# Patient Record
Sex: MALE | Race: BLACK OR AFRICAN AMERICAN | Employment: OTHER | URBAN - METROPOLITAN AREA
[De-identification: names, ages, dates, MRNs, and addresses within clinical notes are randomized per-mention and may not be internally consistent; named-entity substitution may affect disease eponyms.]

---

## 2018-09-19 ENCOUNTER — APPOINTMENT (OUTPATIENT)
Dept: GENERAL RADIOLOGY | Age: 63
End: 2018-09-19
Attending: PHYSICIAN ASSISTANT
Payer: SELF-PAY

## 2018-09-19 ENCOUNTER — HOSPITAL ENCOUNTER (EMERGENCY)
Age: 63
Discharge: HOME | End: 2018-09-19
Attending: EMERGENCY MEDICINE | Admitting: EMERGENCY MEDICINE
Payer: SELF-PAY

## 2018-09-19 VITALS
TEMPERATURE: 97.9 F | HEART RATE: 80 BPM | WEIGHT: 193 LBS | BODY MASS INDEX: 24.77 KG/M2 | DIASTOLIC BLOOD PRESSURE: 80 MMHG | RESPIRATION RATE: 16 BRPM | OXYGEN SATURATION: 99 % | SYSTOLIC BLOOD PRESSURE: 140 MMHG | HEIGHT: 74 IN

## 2018-09-19 DIAGNOSIS — M25.511 PAIN IN JOINT OF RIGHT SHOULDER: ICD-10-CM

## 2018-09-19 DIAGNOSIS — S43.004A DISLOCATION OF RIGHT SHOULDER JOINT, INITIAL ENCOUNTER: Primary | ICD-10-CM

## 2018-09-19 PROCEDURE — 99283 EMERGENCY DEPT VISIT LOW MDM: CPT

## 2018-09-19 PROCEDURE — 74011250636 HC RX REV CODE- 250/636: Performed by: PHYSICIAN ASSISTANT

## 2018-09-19 PROCEDURE — 75810000301 HC ER LEVEL 1 CLOSED TREATMNT FRACTURE/DISLOCATION

## 2018-09-19 PROCEDURE — 96374 THER/PROPH/DIAG INJ IV PUSH: CPT

## 2018-09-19 PROCEDURE — 73030 X-RAY EXAM OF SHOULDER: CPT

## 2018-09-19 PROCEDURE — 74011250637 HC RX REV CODE- 250/637: Performed by: PHYSICIAN ASSISTANT

## 2018-09-19 RX ORDER — MIDAZOLAM HYDROCHLORIDE 1 MG/ML
6 INJECTION, SOLUTION INTRAMUSCULAR; INTRAVENOUS ONCE
Status: DISCONTINUED | OUTPATIENT
Start: 2018-09-19 | End: 2018-09-20 | Stop reason: HOSPADM

## 2018-09-19 RX ORDER — FENTANYL CITRATE 50 UG/ML
100 INJECTION, SOLUTION INTRAMUSCULAR; INTRAVENOUS ONCE
Status: COMPLETED | OUTPATIENT
Start: 2018-09-19 | End: 2018-09-19

## 2018-09-19 RX ORDER — OXYCODONE AND ACETAMINOPHEN 5; 325 MG/1; MG/1
1 TABLET ORAL
Status: COMPLETED | OUTPATIENT
Start: 2018-09-19 | End: 2018-09-19

## 2018-09-19 RX ORDER — HYDROCODONE BITARTRATE AND ACETAMINOPHEN 5; 325 MG/1; MG/1
1 TABLET ORAL
Qty: 12 TAB | Refills: 0 | Status: SHIPPED | OUTPATIENT
Start: 2018-09-19

## 2018-09-19 RX ADMIN — FENTANYL CITRATE 100 MCG: 50 INJECTION INTRAMUSCULAR; INTRAVENOUS at 17:32

## 2018-09-19 RX ADMIN — OXYCODONE HYDROCHLORIDE AND ACETAMINOPHEN 1 TABLET: 5; 325 TABLET ORAL at 17:37
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
## 2018-09-19 NOTE — DISCHARGE INSTRUCTIONS
Musculoskeletal Pain: Care Instructions  Your Care Instructions  Different problems with the bones, muscles, nerves, ligaments, and tendons in the body can cause pain. One or more areas of your body may ache or burn. Or they may feel tired, stiff, or sore. The medical term for this type of pain is musculoskeletal pain. It can have many different causes. Sometimes the pain is caused by an injury such as a strain or sprain. Or you might have pain from using one part of your body in the same way over and over again. This is called overuse. In some cases, the cause of the pain is another health problem such as arthritis or fibromyalgia. The doctor will examine you and ask you questions about your health to help find the cause of your pain. Blood tests or imaging tests like an X-ray may also be helpful. But sometimes doctors can't find a cause of the pain. Treatment depends on your symptoms and the cause of the pain, if known. The doctor has checked you carefully, but problems can develop later. If you notice any problems or new symptoms, get medical treatment right away. Follow-up care is a key part of your treatment and safety. Be sure to make and go to all appointments, and call your doctor if you are having problems. It's also a good idea to know your test results and keep a list of the medicines you take. How can you care for yourself at home? · Rest until you feel better. · Do not do anything that makes the pain worse. Return to exercise gradually if you feel better and your doctor says it's okay. · Be safe with medicines. Read and follow all instructions on the label. ¨ If the doctor gave you a prescription medicine for pain, take it as prescribed. ¨ If you are not taking a prescription pain medicine, ask your doctor if you can take an over-the-counter medicine. · Put ice or a cold pack on the area for 10 to 20 minutes at a time to ease pain. Put a thin cloth between the ice and your skin.   When should you call for help? Call your doctor now or seek immediate medical care if:  · You have new pain, or your pain gets worse. · You have new symptoms such as a fever, a rash, or chills. Watch closely for changes in your health, and be sure to contact your doctor if:  · You do not get better as expected. Where can you learn more? Go to "[employer]be  Enter Q624 in the search box to learn more about \"Musculoskeletal Pain: Care Instructions. \"   © 1513-5685 [employer]. Care instructions adapted under license by [employer] (which disclaims liability or warranty for this information). This care instruction is for use with your licensed healthcare professional. If you have questions about a medical condition or this instruction, always ask your healthcare professional. [address] any warranty or liability for your use of this information. Content Version: 63.9.315533; Current as of: November 20, 2015             Dislocated Shoulder: Care Instructions  Your Care Instructions    When the upper arm comes out of the shoulder socket, it is called a dislocated shoulder. After the doctor puts the shoulder back in place, he or she may put your arm in a sling or shoulder immobilizer. This will keep it from moving. Exercise and physical therapy can help your shoulder get strong and move normally again. It may take up to a year for your shoulder to heal and be free of pain. If your shoulder keeps coming out of place, talk to your doctor about surgery. It can prevent dislocations. You may have had a sedative to help you relax. You may be unsteady after having sedation. It can take a few hours for the medicine's effects to wear off. Common side effects of sedation include nausea, vomiting, and feeling sleepy or tired. The doctor has checked you carefully, but problems can develop later.  If you notice any problems or new symptoms, get medical treatment right away.  Follow-up care is a key part of your treatment and safety. Be sure to make and go to all appointments, and call your doctor if you are having problems. It's also a good idea to know your test results and keep a list of the medicines you take. How can you care for yourself at home? · If the doctor gave you a sedative:  ¨ For 24 hours, don't do anything that requires attention to detail. It takes time for the medicine's effects to completely wear off. ¨ For your safety, do not drive or operate any machinery that could be dangerous. Wait until the medicine wears off and you can think clearly and react easily. · If your doctor put your arm in a sling or shoulder immobilizer, wear it as directed. · Take pain medicines exactly as directed. ¨ If the doctor gave you a prescription medicine for pain, take it as prescribed. ¨ If you are not taking a prescription pain medicine, ask your doctor if you can take an over-the-counter medicine. · Put ice or a cold pack on your shoulder for 10 to 20 minutes at a time. Try to do this every 1 to 2 hours for the next 3 days (when you are awake). Put a thin cloth between the ice and your skin. · You may use warm packs after the first 3 days for 15 to 20 minutes at a time. This can ease pain. · If your doctor gave you exercises to do at home, do them exactly as your doctor told you. · Do not do anything that makes the pain worse. When should you call for help? Call 911 anytime you think you may need emergency care.  For example, call if:    · You have trouble breathing.     · You passed out (lost consciousness).    Call your doctor now or seek immediate medical care if:    · You have new or worse nausea or vomiting.     · You have new or worse pain.     · Your hand or fingers are cool or pale or change color.     · You have tingling, weakness, or numbness in your hand or fingers.    Watch closely for changes in your health, and be sure to contact your doctor if:    · You do not get better as expected. Where can you learn more? Go to http://vicente-rosaura.info/. Enter L520 in the search box to learn more about \"Dislocated Shoulder: Care Instructions. \"  Current as of: November 29, 2017  Content Version: 11.7  © 3257-0396 [employer]. Care instructions adapted under license by [employer] (which disclaims liability or warranty for this information). If you have questions about a medical condition or this instruction, always ask your healthcare professional. [address] any warranty or liability for your use of this information.

## 2018-09-19 NOTE — LETTER
NOTIFICATION RETURN TO WORK / SCHOOL 
 
9/19/2018 6:10 PM 
 
Mr. Chiquis Huizar [facility] [address] To Whom It May Concern: 
 
Chiquis Huizar is [facility] under the [facility]. He will return to work/school on: 9/22/18 If there are questions or concerns please have the patient contact our office.  
 
 
 
Sincerely, 
 
 
 
ADAM Jenkins

## 2018-09-19 NOTE — ED TRIAGE NOTES
Pt. States \"I slipped and fell and now I can't move this shoulder\" refers to right shoulder. Reports incident happened \"about a hour ago\".

## 2018-09-20 NOTE — ED NOTES
Reviewed discharge instructions including new medications and suggested follow-up. Questions encouraged and answered. sling care and application reviewed with patient. Questions encouraged and answered. Patient verbalized an understanding

## 2024-11-05 ENCOUNTER — APPOINTMENT (OUTPATIENT)
Facility: HOSPITAL | Age: 69
DRG: 682 | End: 2024-11-05
Payer: OTHER GOVERNMENT

## 2024-11-05 ENCOUNTER — APPOINTMENT (OUTPATIENT)
Facility: HOSPITAL | Age: 69
DRG: 682 | End: 2024-11-05
Attending: EMERGENCY MEDICINE
Payer: OTHER GOVERNMENT

## 2024-11-05 ENCOUNTER — HOSPITAL ENCOUNTER (INPATIENT)
Facility: HOSPITAL | Age: 69
LOS: 3 days | Discharge: HOME | DRG: 682 | End: 2024-11-08
Attending: EMERGENCY MEDICINE | Admitting: INTERNAL MEDICINE
Payer: OTHER GOVERNMENT

## 2024-11-05 DIAGNOSIS — I50.9 ACUTE ON CHRONIC HEART FAILURE, UNSPECIFIED HEART FAILURE TYPE (HCC): ICD-10-CM

## 2024-11-05 DIAGNOSIS — N18.4 ACUTE RENAL FAILURE SUPERIMPOSED ON STAGE 4 CHRONIC KIDNEY DISEASE, UNSPECIFIED ACUTE RENAL FAILURE TYPE (HCC): ICD-10-CM

## 2024-11-05 DIAGNOSIS — R52 PAIN: Primary | ICD-10-CM

## 2024-11-05 DIAGNOSIS — J81.0 ACUTE PULMONARY EDEMA: ICD-10-CM

## 2024-11-05 DIAGNOSIS — N30.00 ACUTE CYSTITIS WITHOUT HEMATURIA: ICD-10-CM

## 2024-11-05 DIAGNOSIS — N17.9 ACUTE RENAL FAILURE SUPERIMPOSED ON STAGE 4 CHRONIC KIDNEY DISEASE, UNSPECIFIED ACUTE RENAL FAILURE TYPE (HCC): ICD-10-CM

## 2024-11-05 LAB
ALBUMIN SERPL-MCNC: 3.5 G/DL (ref 3.4–5)
ALBUMIN/GLOB SERPL: 0.9 (ref 0.8–1.7)
ALP SERPL-CCNC: 76 U/L (ref 45–117)
ALT SERPL-CCNC: 20 U/L (ref 16–61)
ANION GAP SERPL CALC-SCNC: 10 MMOL/L (ref 3–18)
APPEARANCE UR: CLEAR
AST SERPL-CCNC: 19 U/L (ref 10–38)
BACTERIA URNS QL MICRO: ABNORMAL /HPF
BASOPHILS # BLD: 0.1 K/UL (ref 0–0.1)
BASOPHILS NFR BLD: 1 % (ref 0–2)
BILIRUB SERPL-MCNC: 0.5 MG/DL (ref 0.2–1)
BILIRUB UR QL: NEGATIVE
BUN SERPL-MCNC: 61 MG/DL (ref 7–18)
BUN/CREAT SERPL: 16 (ref 12–20)
CALCIUM SERPL-MCNC: 8.5 MG/DL (ref 8.5–10.1)
CHLORIDE SERPL-SCNC: 106 MMOL/L (ref 100–111)
CO2 SERPL-SCNC: 26 MMOL/L (ref 21–32)
COLOR UR: YELLOW
CREAT SERPL-MCNC: 3.83 MG/DL (ref 0.6–1.3)
D DIMER PPP FEU-MCNC: 0.8 UG/ML(FEU)
DIFFERENTIAL METHOD BLD: ABNORMAL
EOSINOPHIL # BLD: 0.1 K/UL (ref 0–0.4)
EOSINOPHIL NFR BLD: 2 % (ref 0–5)
EPITH CASTS URNS QL MICRO: NEGATIVE /LPF (ref 0–5)
ERYTHROCYTE [DISTWIDTH] IN BLOOD BY AUTOMATED COUNT: 16.9 % (ref 11.6–14.5)
GLOBULIN SER CALC-MCNC: 3.8 G/DL (ref 2–4)
GLUCOSE SERPL-MCNC: 91 MG/DL (ref 74–99)
GLUCOSE UR STRIP.AUTO-MCNC: 500 MG/DL
HCT VFR BLD AUTO: 36.1 % (ref 36–48)
HGB BLD-MCNC: 11.4 G/DL (ref 13–16)
HGB UR QL STRIP: ABNORMAL
IMM GRANULOCYTES # BLD AUTO: 0 K/UL (ref 0–0.04)
IMM GRANULOCYTES NFR BLD AUTO: 0 % (ref 0–0.5)
KETONES UR QL STRIP.AUTO: NEGATIVE MG/DL
LEUKOCYTE ESTERASE UR QL STRIP.AUTO: ABNORMAL
LYMPHOCYTES # BLD: 1.1 K/UL (ref 0.9–3.6)
LYMPHOCYTES NFR BLD: 20 % (ref 21–52)
MCH RBC QN AUTO: 25.9 PG (ref 24–34)
MCHC RBC AUTO-ENTMCNC: 31.6 G/DL (ref 31–37)
MCV RBC AUTO: 81.9 FL (ref 78–100)
MONOCYTES # BLD: 0.6 K/UL (ref 0.05–1.2)
MONOCYTES NFR BLD: 11 % (ref 3–10)
NEUTS SEG # BLD: 3.5 K/UL (ref 1.8–8)
NEUTS SEG NFR BLD: 66 % (ref 40–73)
NITRITE UR QL STRIP.AUTO: NEGATIVE
NRBC # BLD: 0 K/UL (ref 0–0.01)
NRBC BLD-RTO: 0 PER 100 WBC
NT PRO BNP: 1956 PG/ML (ref 0–900)
PH UR STRIP: 5.5 (ref 5–8)
PLATELET # BLD AUTO: 168 K/UL (ref 135–420)
PMV BLD AUTO: 8.3 FL (ref 9.2–11.8)
POTASSIUM SERPL-SCNC: 3.6 MMOL/L (ref 3.5–5.5)
PROT SERPL-MCNC: 7.3 G/DL (ref 6.4–8.2)
PROT UR STRIP-MCNC: 100 MG/DL
RBC # BLD AUTO: 4.41 M/UL (ref 4.35–5.65)
RBC #/AREA URNS HPF: ABNORMAL /HPF (ref 0–5)
SODIUM SERPL-SCNC: 142 MMOL/L (ref 136–145)
SP GR UR REFRACTOMETRY: 1.01 (ref 1–1.03)
TROPONIN I SERPL HS-MCNC: 38 NG/L (ref 0–78)
UROBILINOGEN UR QL STRIP.AUTO: 0.2 EU/DL (ref 0.2–1)
WBC # BLD AUTO: 5.4 K/UL (ref 4.6–13.2)
WBC URNS QL MICRO: ABNORMAL /HPF (ref 0–4)

## 2024-11-05 PROCEDURE — 6360000002 HC RX W HCPCS: Performed by: EMERGENCY MEDICINE

## 2024-11-05 PROCEDURE — 93005 ELECTROCARDIOGRAM TRACING: CPT | Performed by: EMERGENCY MEDICINE

## 2024-11-05 PROCEDURE — 99285 EMERGENCY DEPT VISIT HI MDM: CPT

## 2024-11-05 PROCEDURE — 93970 EXTREMITY STUDY: CPT

## 2024-11-05 PROCEDURE — 2580000003 HC RX 258: Performed by: EMERGENCY MEDICINE

## 2024-11-05 PROCEDURE — 71045 X-RAY EXAM CHEST 1 VIEW: CPT

## 2024-11-05 PROCEDURE — 96374 THER/PROPH/DIAG INJ IV PUSH: CPT

## 2024-11-05 PROCEDURE — 85379 FIBRIN DEGRADATION QUANT: CPT

## 2024-11-05 PROCEDURE — 1100000000 HC RM PRIVATE

## 2024-11-05 PROCEDURE — 74176 CT ABD & PELVIS W/O CONTRAST: CPT

## 2024-11-05 PROCEDURE — 84484 ASSAY OF TROPONIN QUANT: CPT

## 2024-11-05 PROCEDURE — 80053 COMPREHEN METABOLIC PANEL: CPT

## 2024-11-05 PROCEDURE — 81001 URINALYSIS AUTO W/SCOPE: CPT

## 2024-11-05 PROCEDURE — 85025 COMPLETE CBC W/AUTO DIFF WBC: CPT

## 2024-11-05 PROCEDURE — 83880 ASSAY OF NATRIURETIC PEPTIDE: CPT

## 2024-11-05 RX ORDER — GLIPIZIDE 10 MG/1
10 TABLET ORAL
COMMUNITY
Start: 2024-06-27

## 2024-11-05 RX ORDER — PSEUDOEPHEDRINE HCL 30 MG
100 TABLET ORAL
COMMUNITY
Start: 2024-06-27

## 2024-11-05 RX ORDER — ASPIRIN 81 MG/1
1 TABLET ORAL DAILY
COMMUNITY
Start: 2024-06-27

## 2024-11-05 RX ORDER — AMLODIPINE BESYLATE 10 MG/1
10 TABLET ORAL
COMMUNITY
Start: 2024-06-27

## 2024-11-05 RX ORDER — TORSEMIDE 20 MG/1
2 TABLET ORAL DAILY
Status: ON HOLD | COMMUNITY
Start: 2024-06-27 | End: 2024-11-08

## 2024-11-05 RX ORDER — ATORVASTATIN CALCIUM 40 MG/1
20 TABLET, FILM COATED ORAL
COMMUNITY
Start: 2024-06-27

## 2024-11-05 RX ORDER — NITROFURANTOIN 25; 75 MG/1; MG/1
100 CAPSULE ORAL
Status: DISCONTINUED | OUTPATIENT
Start: 2024-11-05 | End: 2024-11-05

## 2024-11-05 RX ORDER — FUROSEMIDE 10 MG/ML
40 INJECTION INTRAMUSCULAR; INTRAVENOUS ONCE
Status: COMPLETED | OUTPATIENT
Start: 2024-11-05 | End: 2024-11-05

## 2024-11-05 RX ORDER — CARVEDILOL 25 MG/1
37.5 TABLET ORAL
COMMUNITY
Start: 2024-06-27

## 2024-11-05 RX ADMIN — WATER 1000 MG: 1 INJECTION INTRAMUSCULAR; INTRAVENOUS; SUBCUTANEOUS at 22:43

## 2024-11-05 RX ADMIN — FUROSEMIDE 40 MG: 10 INJECTION, SOLUTION INTRAMUSCULAR; INTRAVENOUS at 20:17

## 2024-11-05 ASSESSMENT — LIFESTYLE VARIABLES
HOW MANY STANDARD DRINKS CONTAINING ALCOHOL DO YOU HAVE ON A TYPICAL DAY: PATIENT DOES NOT DRINK
HOW OFTEN DO YOU HAVE A DRINK CONTAINING ALCOHOL: NEVER

## 2024-11-05 ASSESSMENT — PAIN - FUNCTIONAL ASSESSMENT: PAIN_FUNCTIONAL_ASSESSMENT: NONE - DENIES PAIN

## 2024-11-05 NOTE — ED TRIAGE NOTES
Ambulatory pt c/o bilateral below the knee swelling x \"a couple of months\". Pt reports ignoring s/s until this point. Denies SOB, hx of CHF or CKD  L>R swelling and skin break down

## 2024-11-06 ENCOUNTER — APPOINTMENT (OUTPATIENT)
Facility: HOSPITAL | Age: 69
DRG: 682 | End: 2024-11-06
Attending: INTERNAL MEDICINE
Payer: OTHER GOVERNMENT

## 2024-11-06 PROBLEM — N18.30 CHRONIC KIDNEY DISEASE (CKD), STAGE III (MODERATE) (HCC): Status: ACTIVE | Noted: 2024-11-06

## 2024-11-06 PROBLEM — N39.0 COMPLICATED UTI (URINARY TRACT INFECTION): Status: ACTIVE | Noted: 2024-11-06

## 2024-11-06 PROBLEM — N18.30 TYPE 2 DIABETES MELLITUS WITH STAGE 3 CHRONIC KIDNEY DISEASE, WITHOUT LONG-TERM CURRENT USE OF INSULIN (HCC): Status: ACTIVE | Noted: 2024-11-06

## 2024-11-06 PROBLEM — N18.30 TYPE 2 DIABETES MELLITUS WITH STAGE 3 CHRONIC KIDNEY DISEASE, WITHOUT LONG-TERM CURRENT USE OF INSULIN (HCC): Chronic | Status: ACTIVE | Noted: 2024-11-06

## 2024-11-06 PROBLEM — N18.30 CHRONIC KIDNEY DISEASE (CKD), STAGE III (MODERATE) (HCC): Chronic | Status: ACTIVE | Noted: 2024-11-06

## 2024-11-06 PROBLEM — E78.5 HYPERLIPIDEMIA: Status: ACTIVE | Noted: 2024-11-06

## 2024-11-06 PROBLEM — I10 HYPERTENSION: Chronic | Status: ACTIVE | Noted: 2024-11-06

## 2024-11-06 PROBLEM — E11.22 TYPE 2 DIABETES MELLITUS WITH STAGE 3 CHRONIC KIDNEY DISEASE, WITHOUT LONG-TERM CURRENT USE OF INSULIN (HCC): Chronic | Status: ACTIVE | Noted: 2024-11-06

## 2024-11-06 PROBLEM — E78.5 HYPERLIPIDEMIA: Chronic | Status: ACTIVE | Noted: 2024-11-06

## 2024-11-06 PROBLEM — E11.22 TYPE 2 DIABETES MELLITUS WITH STAGE 3 CHRONIC KIDNEY DISEASE, WITHOUT LONG-TERM CURRENT USE OF INSULIN (HCC): Status: ACTIVE | Noted: 2024-11-06

## 2024-11-06 PROBLEM — I50.9 ACUTE ON CHRONIC HEART FAILURE (HCC): Status: ACTIVE | Noted: 2024-11-06

## 2024-11-06 PROBLEM — I10 HYPERTENSION: Status: ACTIVE | Noted: 2024-11-06

## 2024-11-06 LAB
ALBUMIN SERPL-MCNC: 3.2 G/DL (ref 3.4–5)
ALBUMIN/GLOB SERPL: 0.8 (ref 0.8–1.7)
ALP SERPL-CCNC: 74 U/L (ref 45–117)
ALT SERPL-CCNC: 18 U/L (ref 16–61)
ANION GAP SERPL CALC-SCNC: 8 MMOL/L (ref 3–18)
AST SERPL-CCNC: 17 U/L (ref 10–38)
BASOPHILS # BLD: 0 K/UL (ref 0–0.1)
BASOPHILS NFR BLD: 1 % (ref 0–2)
BILIRUB SERPL-MCNC: 0.5 MG/DL (ref 0.2–1)
BUN SERPL-MCNC: 56 MG/DL (ref 7–18)
BUN/CREAT SERPL: 15 (ref 12–20)
CALCIUM SERPL-MCNC: 9.4 MG/DL (ref 8.5–10.1)
CHLORIDE SERPL-SCNC: 109 MMOL/L (ref 100–111)
CO2 SERPL-SCNC: 27 MMOL/L (ref 21–32)
CREAT SERPL-MCNC: 3.63 MG/DL (ref 0.6–1.3)
DIFFERENTIAL METHOD BLD: ABNORMAL
ECHO AO ASC DIAM: 4.1 CM
ECHO AO ASCENDING AORTA INDEX: 1.99 CM/M2
ECHO AO ROOT DIAM: 3.7 CM
ECHO AO ROOT INDEX: 1.8 CM/M2
ECHO AV AREA PEAK VELOCITY: 3 CM2
ECHO AV AREA VTI: 3.2 CM2
ECHO AV AREA/BSA PEAK VELOCITY: 1.5 CM2/M2
ECHO AV AREA/BSA VTI: 1.6 CM2/M2
ECHO AV MEAN GRADIENT: 4 MMHG
ECHO AV MEAN VELOCITY: 1 M/S
ECHO AV PEAK GRADIENT: 8 MMHG
ECHO AV PEAK VELOCITY: 1.4 M/S
ECHO AV VELOCITY RATIO: 0.71
ECHO AV VTI: 28.5 CM
ECHO BSA: 2.04 M2
ECHO BSA: 2.04 M2
ECHO EST RA PRESSURE: 8 MMHG
ECHO LA VOL A-L A2C: 96 ML (ref 18–58)
ECHO LA VOL A-L A4C: 133 ML (ref 18–58)
ECHO LA VOL BP: 108 ML (ref 18–58)
ECHO LA VOL MOD A2C: 87 ML (ref 18–58)
ECHO LA VOL MOD A4C: 122 ML (ref 18–58)
ECHO LA VOL/BSA BIPLANE: 52 ML/M2 (ref 16–34)
ECHO LA VOLUME AREA LENGTH: 120 ML
ECHO LA VOLUME INDEX A-L A2C: 47 ML/M2 (ref 16–34)
ECHO LA VOLUME INDEX A-L A4C: 65 ML/M2 (ref 16–34)
ECHO LA VOLUME INDEX AREA LENGTH: 58 ML/M2 (ref 16–34)
ECHO LA VOLUME INDEX MOD A2C: 42 ML/M2 (ref 16–34)
ECHO LA VOLUME INDEX MOD A4C: 59 ML/M2 (ref 16–34)
ECHO LV E' LATERAL VELOCITY: 7.8 CM/S
ECHO LV E' SEPTAL VELOCITY: 5.1 CM/S
ECHO LV EDV A2C: 156 ML
ECHO LV EDV A4C: 141 ML
ECHO LV EDV BP: 149 ML (ref 67–155)
ECHO LV EDV INDEX A4C: 68 ML/M2
ECHO LV EDV INDEX BP: 72 ML/M2
ECHO LV EDV NDEX A2C: 76 ML/M2
ECHO LV EJECTION FRACTION A2C: 53 %
ECHO LV EJECTION FRACTION A4C: 51 %
ECHO LV EJECTION FRACTION BIPLANE: 52 % (ref 55–100)
ECHO LV ESV A2C: 74 ML
ECHO LV ESV A4C: 69 ML
ECHO LV ESV BP: 72 ML (ref 22–58)
ECHO LV ESV INDEX A2C: 36 ML/M2
ECHO LV ESV INDEX A4C: 33 ML/M2
ECHO LV ESV INDEX BP: 35 ML/M2
ECHO LV FRACTIONAL SHORTENING: 23 % (ref 28–44)
ECHO LV INTERNAL DIMENSION DIASTOLE INDEX: 2.77 CM/M2
ECHO LV INTERNAL DIMENSION DIASTOLIC: 5.7 CM (ref 4.2–5.9)
ECHO LV INTERNAL DIMENSION SYSTOLIC INDEX: 2.14 CM/M2
ECHO LV INTERNAL DIMENSION SYSTOLIC: 4.4 CM
ECHO LV IVSD: 1 CM (ref 0.6–1)
ECHO LV MASS 2D: 226.4 G (ref 88–224)
ECHO LV MASS INDEX 2D: 109.9 G/M2 (ref 49–115)
ECHO LV POSTERIOR WALL DIASTOLIC: 1 CM (ref 0.6–1)
ECHO LV RELATIVE WALL THICKNESS RATIO: 0.35
ECHO LVOT AREA: 4.2 CM2
ECHO LVOT AV VTI INDEX: 0.78
ECHO LVOT DIAM: 2.3 CM
ECHO LVOT MEAN GRADIENT: 2 MMHG
ECHO LVOT PEAK GRADIENT: 4 MMHG
ECHO LVOT PEAK VELOCITY: 1 M/S
ECHO LVOT STROKE VOLUME INDEX: 44.8 ML/M2
ECHO LVOT SV: 92.2 ML
ECHO LVOT VTI: 22.2 CM
ECHO MV A VELOCITY: 0.73 M/S
ECHO MV E DECELERATION TIME (DT): 282.4 MS
ECHO MV E VELOCITY: 1 M/S
ECHO MV E/A RATIO: 1.37
ECHO MV E/E' LATERAL: 12.82
ECHO MV E/E' RATIO (AVERAGED): 16.21
ECHO MV E/E' SEPTAL: 19.61
ECHO PV MAX VELOCITY: 0.8 M/S
ECHO PV PEAK GRADIENT: 3 MMHG
ECHO RA AREA 4C: 21 CM2
ECHO RIGHT VENTRICULAR SYSTOLIC PRESSURE (RVSP): 59 MMHG
ECHO RV BASAL DIMENSION: 5.6 CM
ECHO RV FREE WALL PEAK S': 9.7 CM/S
ECHO RV MID DIMENSION: 3 CM
ECHO RV TAPSE: 2.6 CM (ref 1.7–?)
ECHO TV REGURGITANT MAX VELOCITY: 3.58 M/S
ECHO TV REGURGITANT PEAK GRADIENT: 51 MMHG
EOSINOPHIL # BLD: 0.1 K/UL (ref 0–0.4)
EOSINOPHIL NFR BLD: 3 % (ref 0–5)
ERYTHROCYTE [DISTWIDTH] IN BLOOD BY AUTOMATED COUNT: 17.2 % (ref 11.6–14.5)
GLOBULIN SER CALC-MCNC: 3.9 G/DL (ref 2–4)
GLUCOSE BLD STRIP.AUTO-MCNC: 131 MG/DL (ref 70–110)
GLUCOSE BLD STRIP.AUTO-MCNC: 168 MG/DL (ref 70–110)
GLUCOSE BLD STRIP.AUTO-MCNC: 199 MG/DL (ref 70–110)
GLUCOSE BLD STRIP.AUTO-MCNC: 99 MG/DL (ref 70–110)
GLUCOSE SERPL-MCNC: 89 MG/DL (ref 74–99)
HCT VFR BLD AUTO: 36.8 % (ref 36–48)
HGB BLD-MCNC: 11.5 G/DL (ref 13–16)
IMM GRANULOCYTES # BLD AUTO: 0 K/UL (ref 0–0.04)
IMM GRANULOCYTES NFR BLD AUTO: 0 % (ref 0–0.5)
LYMPHOCYTES # BLD: 1 K/UL (ref 0.9–3.6)
LYMPHOCYTES NFR BLD: 21 % (ref 21–52)
MCH RBC QN AUTO: 25.8 PG (ref 24–34)
MCHC RBC AUTO-ENTMCNC: 31.3 G/DL (ref 31–37)
MCV RBC AUTO: 82.5 FL (ref 78–100)
MONOCYTES # BLD: 0.7 K/UL (ref 0.05–1.2)
MONOCYTES NFR BLD: 14 % (ref 3–10)
NEUTS SEG # BLD: 2.9 K/UL (ref 1.8–8)
NEUTS SEG NFR BLD: 62 % (ref 40–73)
NRBC # BLD: 0 K/UL (ref 0–0.01)
NRBC BLD-RTO: 0 PER 100 WBC
PLATELET # BLD AUTO: 178 K/UL (ref 135–420)
PMV BLD AUTO: 10 FL (ref 9.2–11.8)
POTASSIUM SERPL-SCNC: 3.6 MMOL/L (ref 3.5–5.5)
PROT SERPL-MCNC: 7.1 G/DL (ref 6.4–8.2)
RBC # BLD AUTO: 4.46 M/UL (ref 4.35–5.65)
SODIUM SERPL-SCNC: 144 MMOL/L (ref 136–145)
WBC # BLD AUTO: 4.7 K/UL (ref 4.6–13.2)

## 2024-11-06 PROCEDURE — 82962 GLUCOSE BLOOD TEST: CPT

## 2024-11-06 PROCEDURE — 6360000002 HC RX W HCPCS: Performed by: INTERNAL MEDICINE

## 2024-11-06 PROCEDURE — 94761 N-INVAS EAR/PLS OXIMETRY MLT: CPT

## 2024-11-06 PROCEDURE — 99223 1ST HOSP IP/OBS HIGH 75: CPT | Performed by: INTERNAL MEDICINE

## 2024-11-06 PROCEDURE — 6370000000 HC RX 637 (ALT 250 FOR IP): Performed by: INTERNAL MEDICINE

## 2024-11-06 PROCEDURE — 1100000003 HC PRIVATE W/ TELEMETRY

## 2024-11-06 PROCEDURE — 80053 COMPREHEN METABOLIC PANEL: CPT

## 2024-11-06 PROCEDURE — 93970 EXTREMITY STUDY: CPT | Performed by: INTERNAL MEDICINE

## 2024-11-06 PROCEDURE — C8929 TTE W OR WO FOL WCON,DOPPLER: HCPCS

## 2024-11-06 PROCEDURE — 85025 COMPLETE CBC W/AUTO DIFF WBC: CPT

## 2024-11-06 PROCEDURE — 99232 SBSQ HOSP IP/OBS MODERATE 35: CPT | Performed by: INTERNAL MEDICINE

## 2024-11-06 PROCEDURE — 6360000004 HC RX CONTRAST MEDICATION: Performed by: INTERNAL MEDICINE

## 2024-11-06 PROCEDURE — 36415 COLL VENOUS BLD VENIPUNCTURE: CPT

## 2024-11-06 PROCEDURE — 2580000003 HC RX 258: Performed by: INTERNAL MEDICINE

## 2024-11-06 PROCEDURE — 87086 URINE CULTURE/COLONY COUNT: CPT

## 2024-11-06 PROCEDURE — 93306 TTE W/DOPPLER COMPLETE: CPT | Performed by: INTERNAL MEDICINE

## 2024-11-06 RX ORDER — DEXTROSE MONOHYDRATE 100 MG/ML
INJECTION, SOLUTION INTRAVENOUS CONTINUOUS PRN
Status: DISCONTINUED | OUTPATIENT
Start: 2024-11-06 | End: 2024-11-08 | Stop reason: HOSPADM

## 2024-11-06 RX ORDER — FUROSEMIDE 10 MG/ML
20 INJECTION INTRAMUSCULAR; INTRAVENOUS DAILY
Status: DISCONTINUED | OUTPATIENT
Start: 2024-11-07 | End: 2024-11-06

## 2024-11-06 RX ORDER — ENOXAPARIN SODIUM 100 MG/ML
30 INJECTION SUBCUTANEOUS DAILY
Status: DISCONTINUED | OUTPATIENT
Start: 2024-11-06 | End: 2024-11-06

## 2024-11-06 RX ORDER — IPRATROPIUM BROMIDE AND ALBUTEROL SULFATE 2.5; .5 MG/3ML; MG/3ML
1 SOLUTION RESPIRATORY (INHALATION) EVERY 4 HOURS PRN
Status: DISCONTINUED | OUTPATIENT
Start: 2024-11-06 | End: 2024-11-08 | Stop reason: HOSPADM

## 2024-11-06 RX ORDER — SODIUM CHLORIDE 9 MG/ML
INJECTION, SOLUTION INTRAVENOUS PRN
Status: DISCONTINUED | OUTPATIENT
Start: 2024-11-06 | End: 2024-11-08 | Stop reason: HOSPADM

## 2024-11-06 RX ORDER — HYDRALAZINE HYDROCHLORIDE 20 MG/ML
10 INJECTION INTRAMUSCULAR; INTRAVENOUS EVERY 6 HOURS PRN
Status: DISCONTINUED | OUTPATIENT
Start: 2024-11-06 | End: 2024-11-08 | Stop reason: HOSPADM

## 2024-11-06 RX ORDER — INSULIN LISPRO 100 [IU]/ML
0-8 INJECTION, SOLUTION INTRAVENOUS; SUBCUTANEOUS
Status: DISCONTINUED | OUTPATIENT
Start: 2024-11-06 | End: 2024-11-08 | Stop reason: HOSPADM

## 2024-11-06 RX ORDER — SODIUM CHLORIDE 0.9 % (FLUSH) 0.9 %
5-40 SYRINGE (ML) INJECTION EVERY 12 HOURS SCHEDULED
Status: DISCONTINUED | OUTPATIENT
Start: 2024-11-06 | End: 2024-11-08 | Stop reason: HOSPADM

## 2024-11-06 RX ORDER — ASPIRIN 81 MG/1
81 TABLET ORAL DAILY
Status: DISCONTINUED | OUTPATIENT
Start: 2024-11-06 | End: 2024-11-08 | Stop reason: HOSPADM

## 2024-11-06 RX ORDER — ONDANSETRON 4 MG/1
4 TABLET, ORALLY DISINTEGRATING ORAL EVERY 8 HOURS PRN
Status: DISCONTINUED | OUTPATIENT
Start: 2024-11-06 | End: 2024-11-08 | Stop reason: HOSPADM

## 2024-11-06 RX ORDER — FUROSEMIDE 10 MG/ML
20 INJECTION INTRAMUSCULAR; INTRAVENOUS 2 TIMES DAILY
Status: DISCONTINUED | OUTPATIENT
Start: 2024-11-06 | End: 2024-11-06

## 2024-11-06 RX ORDER — ACETAMINOPHEN 325 MG/1
650 TABLET ORAL EVERY 6 HOURS PRN
Status: DISCONTINUED | OUTPATIENT
Start: 2024-11-06 | End: 2024-11-08 | Stop reason: HOSPADM

## 2024-11-06 RX ORDER — MECOBALAMIN 5000 MCG
5 TABLET,DISINTEGRATING ORAL NIGHTLY PRN
Status: DISCONTINUED | OUTPATIENT
Start: 2024-11-06 | End: 2024-11-08 | Stop reason: HOSPADM

## 2024-11-06 RX ORDER — ACETAMINOPHEN 650 MG/1
650 SUPPOSITORY RECTAL EVERY 6 HOURS PRN
Status: DISCONTINUED | OUTPATIENT
Start: 2024-11-06 | End: 2024-11-08 | Stop reason: HOSPADM

## 2024-11-06 RX ORDER — SODIUM CHLORIDE 0.9 % (FLUSH) 0.9 %
5-40 SYRINGE (ML) INJECTION PRN
Status: DISCONTINUED | OUTPATIENT
Start: 2024-11-06 | End: 2024-11-08 | Stop reason: HOSPADM

## 2024-11-06 RX ORDER — POLYETHYLENE GLYCOL 3350 17 G/17G
17 POWDER, FOR SOLUTION ORAL DAILY PRN
Status: DISCONTINUED | OUTPATIENT
Start: 2024-11-06 | End: 2024-11-08 | Stop reason: HOSPADM

## 2024-11-06 RX ORDER — ATORVASTATIN CALCIUM 20 MG/1
20 TABLET, FILM COATED ORAL NIGHTLY
Status: DISCONTINUED | OUTPATIENT
Start: 2024-11-06 | End: 2024-11-08 | Stop reason: HOSPADM

## 2024-11-06 RX ORDER — FUROSEMIDE 10 MG/ML
40 INJECTION INTRAMUSCULAR; INTRAVENOUS 2 TIMES DAILY
Status: DISCONTINUED | OUTPATIENT
Start: 2024-11-06 | End: 2024-11-07

## 2024-11-06 RX ORDER — ONDANSETRON 2 MG/ML
4 INJECTION INTRAMUSCULAR; INTRAVENOUS EVERY 6 HOURS PRN
Status: DISCONTINUED | OUTPATIENT
Start: 2024-11-06 | End: 2024-11-08 | Stop reason: HOSPADM

## 2024-11-06 RX ORDER — AMLODIPINE BESYLATE 10 MG/1
10 TABLET ORAL DAILY
Status: DISCONTINUED | OUTPATIENT
Start: 2024-11-06 | End: 2024-11-08 | Stop reason: HOSPADM

## 2024-11-06 RX ORDER — HEPARIN SODIUM 5000 [USP'U]/ML
5000 INJECTION, SOLUTION INTRAVENOUS; SUBCUTANEOUS EVERY 8 HOURS SCHEDULED
Status: DISCONTINUED | OUTPATIENT
Start: 2024-11-07 | End: 2024-11-08 | Stop reason: HOSPADM

## 2024-11-06 RX ORDER — DOCUSATE SODIUM 100 MG/1
100 CAPSULE, LIQUID FILLED ORAL DAILY PRN
Status: DISCONTINUED | OUTPATIENT
Start: 2024-11-06 | End: 2024-11-08 | Stop reason: HOSPADM

## 2024-11-06 RX ADMIN — AMLODIPINE BESYLATE 10 MG: 10 TABLET ORAL at 12:48

## 2024-11-06 RX ADMIN — FUROSEMIDE 20 MG: 10 INJECTION, SOLUTION INTRAMUSCULAR; INTRAVENOUS at 11:17

## 2024-11-06 RX ADMIN — HYDRALAZINE HYDROCHLORIDE 10 MG: 20 INJECTION INTRAMUSCULAR; INTRAVENOUS at 14:53

## 2024-11-06 RX ADMIN — PERFLUTREN 2 ML: 6.52 INJECTION, SUSPENSION INTRAVENOUS at 09:29

## 2024-11-06 RX ADMIN — ENOXAPARIN SODIUM 30 MG: 100 INJECTION SUBCUTANEOUS at 11:17

## 2024-11-06 RX ADMIN — FUROSEMIDE 40 MG: 10 INJECTION, SOLUTION INTRAMUSCULAR; INTRAVENOUS at 16:55

## 2024-11-06 RX ADMIN — CARVEDILOL 37.5 MG: 25 TABLET, FILM COATED ORAL at 12:48

## 2024-11-06 RX ADMIN — INSULIN LISPRO 2 UNITS: 100 INJECTION, SOLUTION INTRAVENOUS; SUBCUTANEOUS at 12:52

## 2024-11-06 RX ADMIN — SODIUM CHLORIDE, PRESERVATIVE FREE 10 ML: 5 INJECTION INTRAVENOUS at 20:53

## 2024-11-06 RX ADMIN — ASPIRIN 81 MG: 81 TABLET, COATED ORAL at 12:48

## 2024-11-06 RX ADMIN — CARVEDILOL 37.5 MG: 25 TABLET, FILM COATED ORAL at 20:53

## 2024-11-06 RX ADMIN — SODIUM CHLORIDE, PRESERVATIVE FREE 10 ML: 5 INJECTION INTRAVENOUS at 11:18

## 2024-11-06 RX ADMIN — ATORVASTATIN CALCIUM 20 MG: 20 TABLET, FILM COATED ORAL at 20:52

## 2024-11-06 ASSESSMENT — LIFESTYLE VARIABLES
HOW MANY STANDARD DRINKS CONTAINING ALCOHOL DO YOU HAVE ON A TYPICAL DAY: PATIENT DOES NOT DRINK
HOW OFTEN DO YOU HAVE A DRINK CONTAINING ALCOHOL: PATIENT DECLINED

## 2024-11-06 ASSESSMENT — PAIN SCALES - GENERAL
PAINLEVEL_OUTOF10: 0

## 2024-11-06 NOTE — CONSULTS
swelling    TECHNIQUE: Portable chest radiograph    COMPARISON: None    FINDINGS:  Enlarged cardiac silhouette with early pulmonary vascular  congestion/interstitial edema. No pneumothorax. Ectatic aorta.    Impression  Findings suggest early cardiogenic pulmonary edema.    Electronically signed by Moo Lovelace      Signed By: LEE iLndsey     November 6, 2024

## 2024-11-06 NOTE — PROGRESS NOTES
Echo (TTE) complete (PRN contrast/bubble/strain/3D)    Collection Time: 11/06/24  9:20 AM   Result Value Ref Range    IVSd 1.0 0.6 - 1.0 cm    LVIDd 5.7 4.2 - 5.9 cm    LVIDs 4.4 cm    LVOT Diameter 2.3 cm    LVPWd 1.0 0.6 - 1.0 cm    EF BP 52 (A) 55 - 100 %    LV Ejection Fraction A2C 53 %    LV Ejection Fraction A4C 51 %    LV EDV A2C 156 mL    LV EDV A4C 141 mL    LV EDV  67 - 155 mL    LV ESV A2C 74 mL    LV ESV A4C 69 mL    LV ESV BP 72 (A) 22 - 58 mL    LVOT Peak Gradient 4 mmHg    LVOT Mean Gradient 2 mmHg    LVOT SV 92.2 ml    LVOT Peak Velocity 1.0 m/s    LVOT VTI 22.2 cm    RV Free Wall Peak S' 9.7 cm/s    LA Volume A/L 120 mL    LA Volume A-L A4C 96 (A) 18 - 58 mL    LA Volume A-L A4C 133 (A) 18 - 58 mL    LA Volume MOD A2C 87 (A) 18 - 58 mL    LA Volume MOD A4C 122 (A) 18 - 58 mL    LA Volume  (A) 18 - 58 mL    AV Area by Peak Velocity 3.0 cm2    AV Area by VTI 3.2 cm2    AV Peak Gradient 8 mmHg    AV Mean Gradient 4 mmHg    AV Peak Velocity 1.4 m/s    AV Mean Velocity 1.0 m/s    AV VTI 28.5 cm    MV A Velocity 0.73 m/s    MV E Wave Deceleration Time 282.4 ms    MV E Velocity 1.00 m/s    LV E' Lateral Velocity 7.8 cm/s    LV E' Septal Velocity 5.1 cm/s    PV Peak Gradient 3 mmHg    PV Max Velocity 0.8 m/s    TAPSE 2.6 1.7 cm    TR Peak Gradient 51 mmHg    TR Max Velocity 3.58 m/s    Ascending Aorta 4.1 cm    Aortic Root 3.7 cm    Body Surface Area 2.04 m2    Fractional Shortening 2D 23 28 - 44 %    LV ESV Index BP 35 mL/m2    LV EDV Index BP 72 mL/m2    LV ESV Index A4C 33 mL/m2    LV EDV Index A4C 68 mL/m2    LV ESV Index A2C 36 mL/m2    LV EDV Index A2C 76 mL/m2    LVIDd Index 2.77 cm/m2    LVIDs Index 2.14 cm/m2    LV RWT Ratio 0.35     LV Mass 2D 226.4 (A) 88 - 224 g    LV Mass 2D Index 109.9 49 - 115 g/m2    MV E/A 1.37     E/E' Ratio (Averaged) 16.21     E/E' Lateral 12.82     E/E' Septal 19.61     LA Volume Index BP 52 (A) 16 - 34 ml/m2    LA Volume Index A/L 58 16 - 34 mL/m2    LVOT  Stroke Volume Index 44.8 mL/m2    LVOT Area 4.2 cm2    LA Volume Index A-L A2C 47 (A) 16 - 34 mL/m2    LA Volume Index A-L A4C 65 (A) 16 - 34 mL/m2    LA Volume Index MOD A2C 42 (A) 16 - 34 ml/m2    LA Volume Index MOD A4C 59 (A) 16 - 34 ml/m2    Ao Root Index 1.80 cm/m2    Ascending Aorta Index 1.99 cm/m2    AV Velocity Ratio 0.71     LVOT:AV VTI Index 0.78     GURPREET/BSA VTI 1.6 cm2/m2    GURPREET/BSA Peak Velocity 1.5 cm2/m2    RV Basal Dimension 5.6 cm    RV Mid Dimension 3.0 cm    RA Area 4C 21.0 cm2    Est. RA Pressure 8 mmHg    RVSP 59 mmHg   POCT Glucose    Collection Time: 11/06/24 11:04 AM   Result Value Ref Range    POC Glucose 199 (H) 70 - 110 mg/dL       Signed By: Ian Pineda DO     November 6, 2024      Disclaimer: Sections of this note are dictated using utilizing voice recognition software.  Minor typographical errors may be present. If questions arise, please do not hesitate to contact me or call our department.

## 2024-11-06 NOTE — H&P
History and Physical    Patient: Samir Martino MRN: [id number]  SSN: [id number]    YOB: 1955  Age: 69 y.o.  Sex: male      Subjective:      Samir Martino is a 69 y.o. male who presents to [facility] ER with complaint of Leg Swelling.  Patient reports that he has had progressive leg swelling for the last year that has gradually gotten worse.  Patient denies shortness of breath, dyspnea on exertion, weight gain, and orthopnea.  Patient reports intentional weight loss.  Patient reports that he has been having some nausea and vomiting (last on 11/02/024) and non-productive cough without pain with inspiration or chest congestion.  Patient reports that he has been having increased urinary urgency without dysuria or increased urinary frequency.  Patient verifies his Home Medication List.  Patient denies being aware of having a diagnosis of Chronic Kidney Disease (a.k.a. CKD) Stage III.  Patient denies fevers, chills, diarrhea, dysuria, pain with inspiration, orthopnea, shortness of breath, dyspnea on exertion, and weight gain.    In [facility] ER, Patient is noted to have Temperature 98.9°F, Heart Rate 46 bpm to 63 bpm, Respiration Rate 21 bpm, Blood Pressure 147/65 mm Hg to 174/97 mm Hg, SpO2 96% on Room Air, WBC 5.4 K/uL, Hgb 11.4 g/dL, Neut% 66%, Neut# 3.5 K/uL, UA (Glucose 500 mg/dL, Small Blood, 100 Protein, Trace Leukocyte eseterase, WBC 11-20, RBC 0-3, Bacteria 4+), Na+ 142 mmol/L, K+ 3.6 mmol/L, BUN 61 mg/dL, Creatinine 3.83 mg/dL (Baseline Creatinine Unknown), eGFR 16, Glucose 91 mg/dL, Albumin 3.5 g/dL, Troponin 38 ng/L, Pro-BNP 1,956 pg/mL, and D-Dimer 0.80 ug/mL.  CXR has been read by Radiologist to show \"Findings suggest early cardiogenic pulmonary edema.\"  CT Abdomen/Pelvis w/ Contrast has been read by Radiologist to show \"1. No nephrolithiasis or hydroureteronephrosis.  2. Mild bladder wall thickening. Nonspecific. Possible cystitis. Correlate with urinalysis.  Allergies    Review of Systems:  (-) Fevers  (-) Chills  (-) Chest Congestion  (-) Cough  (-) Increased Sputum Production  (-) Pain with Inspiration  (+) Nausea  (+) Vomiting  (-) Diarrhea  (+) Urinary Urgency  (-) Dysuria  All other systems have been reviewed and are negative    Objective:     Vitals:    11/06/24 1000 11/06/24 1006 11/06/24 1104 11/06/24 1137   BP: (!) 180/79 (!) 174/97 (!) 170/93    Pulse: 57  69 55   Resp: 18  20    Temp: 98.2 °F (36.8 °C)  97.5 °F (36.4 °C)    TempSrc: Oral  Oral    SpO2: 97%  98%    Weight:  79.8 kg (176 lb)     Height:  1.88 m (6' 2\")          Physical Exam:  General:  Older adult male lying in bed in no acute distress  HEENT:  Atraumatic, normocephalic; (+) Left Temporal Strabismus; Pupils equally round and reactive to light; Extraocular muscles otherwise intact; Moist Oropharynx without erythema, edema, or exudates  Chest:  No pectus carinatum; No pectus excavatum  Cardiovascular:  Regular rate and rhythm without rubs, gallops, or murmurs  Respiratory:  Clear to Auscultation Bilaterally without wheezes, rales, or rhonchi; normal effort of breathing on Room Air  Abdominal:  Soft, non-tense, non-tender abdomen; BS present without guarding, rebound, or masses  :  Deferred  Extremities:  Pulses 2+ x4 with (+) Trace Pitting Edema to Bilateral Proximal Tibias without clubbing or cyanosis  Musculoskeletal:  Strength 5/5 in BUE and BLE  Integument:  No rash on face, forearms, or legs  Neurological:  Alert & Ostensibly Oriented x4/4; No gross deficits of Visual Acuity, Eye Movement, Jaw Opening, Facial Expression, Hearing, Phonation, or Head Movement; No gross deficits of Tongue Movement or Slurring of Speech  Psychiatric:  Affect is appropriate; Language is present and fluent; Behavior is appropriate      Laboratory Studies:  All labs obtained in [facility] ER in the last 24 hours have been reviewed.     Images Reviewed:  XR CHEST PORTABLE  Order: [id number]  Status: Final result

## 2024-11-06 NOTE — PROGRESS NOTES
INTERIM UPDATE - 2221 EST on 10/05/2024    [facility] ([facility] ER Physician calls requesting admission for a 69-year-old male who presents with complaint of Leg Swelling.      [facility] ER Physician reports that Patient may have Chronic Leg Swelling for approximately 1 years.    [facility] ER Physician reports that Patient's Creatinine is 3.83 mg/dL today with Unknown Baseline.  Patient is also noted to have Pulmonary Edema.  Chart Review shows that Patient has a History of Unspecified Congestive Heart Failure.    [facility] ER Physician reports that Patient may have a Urinary Tract Infection (a.k.a. UTI).    Plan:  Instructed Patient to administer IV Ceftriaxone to Patient (in favor of Nitrofurantoin).    Will admit Patient to [facility] ([facility] Telemetry Unit for management of Acute on Chronic Unspecified Congestive Heart Failure (a.k.a. CHF) and possible Urinary Tract Infection (a.k.a. UTI).

## 2024-11-06 NOTE — PLAN OF CARE
Problem: Pain  Goal: Verbalizes/displays adequate comfort level or baseline comfort level  11/6/2024 0841 by Penelope Khalil, RN  Outcome: Progressing  Flowsheets (Taken 11/6/2024 0839)  Verbalizes/displays adequate comfort level or baseline comfort level:   Assess pain using appropriate pain scale every four hours   Implement non-pharmacological measures as appropriate and evaluate response   Administer analgesics based on type and severity of pain and evaluate response     Problem: Safety - Adult  Goal: Free from fall injury  11/6/2024 0841 by Penelope Khalil, RN  Outcome: Progressing  Note: Round on patient every two hours

## 2024-11-06 NOTE — PROGRESS NOTES
Spiritual Health History and Assessment/Progress Note  [facility]    Spiritual/Emotional Needs,  ,  ,      Name: Samir Martino MRN: [id number]    Age: 69 y.o.     Sex: male   Language: English   Voodoo: Advent   Acute renal injury (HCC)     Date: 11/6/2024            Total Time Calculated: 7 min              Spiritual Assessment began in [address] MEDICAL        Referral/Consult From: Multi-disciplinary team   Encounter Overview/Reason: Spiritual/Emotional Needs  Service Provided For: Patient    Rhonda, Belief, Meaning:   Patient has beliefs or practices that help with coping during difficult times  Family/Friends have beliefs or practices that help with coping during difficult times      Importance and Influence:  Patient has spiritual/personal beliefs that influence decisions regarding their health  Family/Friends have spiritual/personal beliefs that influence decisions regarding the patient's health    Community:  Patient feels well-supported. Support system includes: Children and Extended family  Family/Friends are connected with a spiritual community:    Assessment and Plan of Care:     Patient Interventions include: Affirmed coping skills/support systems  Family/Friends Interventions include: Affirmed coping skills/support systems    Patient Plan of Care: No spiritual needs identified for follow-up  Family/Friends Plan of Care: No spiritual needs identified for follow-up    Electronically signed by ODALIS Barrow on 11/6/2024 at 5:26 PM

## 2024-11-06 NOTE — PROGRESS NOTES
OT order received and chart reviewed. Spoke with patient in room and patient declined need for skilled OT at this time. Patient is independent with basic self care tasks and functional mobility. No skilled OT needs indicated at this time; will complete the order.          Thank you for the referral,  Karen Malone MS, OTR/L

## 2024-11-06 NOTE — PROGRESS NOTES
4 Eyes Skin Assessment     NAME:  Samir Martino  YOB: 1955  MEDICAL RECORD NUMBER:  [id number]    The patient is being assessed for  Admission    I agree that at least one RN has performed a thorough Head to Toe Skin Assessment on the patient. ALL assessment sites listed below have been assessed.      Areas assessed by both nurses:    Head, Face, Ears, Shoulders, Back, Chest, Arms, Elbows, Hands, Sacrum. Buttock, Coccyx, Ischium, Legs. Feet and Heels, and Under Medical Devices         Does the Patient have a Wound? No noted wound(s)       Jerad Prevention initiated by RN: Yes  Wound Care Orders initiated by RN: No    Pressure Injury (Stage 3,4, Unstageable, DTI, NWPT, and Complex wounds) if present, place Wound referral order by RN under : No    New Ostomies, if present place, Ostomy referral order under : No     Nurse 1 eSignature: Electronically signed by Penelope Cody RN on 11/6/24 at 8:46 AM EST    **SHARE this note so that the co-signing nurse can place an eSignature**    Nurse 2 eSignature: Electronically signed by MARIBEL HEART RN on 11/6/24 at 9:02 AM EST

## 2024-11-06 NOTE — ED NOTES
TRANSFER - OUT REPORT:    Verbal report given to Penelope FAUST RN on Samir Martino  being transferred to [facility] Rm 468 for routine progression of patient care       Report consisted of patient's Situation, Background, Assessment and   Recommendations(SBAR).     Information from the following report(s) Nurse Handoff Report, ED SBAR, Adult Overview, Intake/Output, MAR, Recent Results, and Cardiac Rhythm Sinus Baltazar  was reviewed with the receiving nurse.    [place] Fall Assessment:    Presents to emergency department  because of falls (Syncope, seizure, or loss of consciousness): No  Age > 70: No  Altered Mental Status, Intoxication with alcohol or substance confusion (Disorientation, impaired judgment, poor safety awaremess, or inability to follow instructions): No  Impaired Mobility: Ambulates or transfers with assistive devices or assistance; Unable to ambulate or transer.: No  Nursing Judgement: No          Lines:   Peripheral IV 11/05/24 Left Antecubital (Active)        Opportunity for questions and clarification was provided.      Patient transported with:  Monitor and all patient belongings.

## 2024-11-06 NOTE — CARE COORDINATION
Chart reviewed and spoke with pt at bedside to initiate d/c planning, face sheet and clinicals faxed to [facility] [phone]. PT and OT cleared pt, cm will cont to follow as needs may arise.   11/06/24 0879   Service Assessment   Patient Orientation Alert and Oriented   Cognition Alert   History Provided By Patient   Primary Caregiver Self   Support Systems Family Members   PCP Verified by CM [facility]   Last Visit to PCP Within last 3 months   Prior Functional Level Independent in ADLs/IADLs   Current Functional Level Independent in ADLs/IADLs   Can patient return to prior living arrangement Yes   Ability to make needs known: Good   Family able to assist with home care needs: Yes   Would you like for me to discuss the discharge plan with any other family members/significant others, and if so, who? No   Financial Resources [place] ([facility])   Community Resources None   Social/Functional History   Lives With Alone   Type of Home Apartment   Home Layout One level   Home Access Level entry   Bathroom Shower/Tub Tub/Shower unit   Bathroom Toilet Standard   Bathroom Equipment None   Bathroom Accessibility Accessible   Receives Help From Family   ADL Assistance Independent   Homemaking Assistance Independent   Ambulation Assistance Independent   Transfer Assistance Independent   Active  Yes   Mode of Transportation Car   Occupation Retired;Full time employment   Type of Occupation pt states does [employer]ulu   Discharge Planning   Type of Residence Apartment   Living Arrangements Alone   Current Services Prior To Admission None   Potential Assistance Needed N/A   DME Ordered? No   Potential Assistance Purchasing Medications No   Type of Home Care Services None   Patient expects to be discharged to: Apartment   One/Two Story Residence One story   History of falls? 0   Services At/After Discharge   Transition of Care Consult (CM Consult) N/A   Services At/After Discharge None    Resource Information

## 2024-11-06 NOTE — PROGRESS NOTES
Physical Therapy  PT order received and chart reviewed. Pt reports independence and no acute mobility deficits. Gets up ad miesha in room. Denies mobility concerns. Will sign off. Thank you for this referral. Renita Rehman, PT, DPT

## 2024-11-06 NOTE — ED PROVIDER NOTES
EMERGENCY DEPARTMENT HISTORY AND PHYSICAL EXAM      Date: 11/5/2024  Patient Name: Samir Martino      History of Presenting Illness     Chief Complaint   Patient presents with    Leg Swelling       Location/Duration/Severity/Modifying factors   Chief Complaint   Patient presents with    Leg Swelling       HPI:  Samir Martino is a 69 y.o. male with PMH significant for HTN presents with 1 year of bilateral leg swelling, worsens throughout the day, improves some while laying down but never resolves. Also notes shortness of breathing while walking up stairs but not at rest or with lying flat. Pt  denies fevers or chills, known hx of blood clots, heart disease, kidney disease. Notes non-painful or itchy rashes to his shins. Treatments attempted at home include none. Has not been seen by his PCP for this yet.    PCP: No primary care provider on file.    Current Facility-Administered Medications   Medication Dose Route Frequency Provider Last Rate Last Admin    cefTRIAXone (ROCEPHIN) 1,000 mg in sterile water 10 mL IV syringe  1,000 mg IntraVENous Once Magdiel Badillo, DO         Current Outpatient Medications   Medication Sig Dispense Refill    amLODIPine (NORVASC) 10 MG tablet Take 1 tablet by mouth      aspirin 81 MG EC tablet Take 1 tablet by mouth daily      atorvastatin (LIPITOR) 40 MG tablet Take 0.5 tablets by mouth      carvedilol (COREG) 25 MG tablet Take 1.5 tablets by mouth      sacubitril-valsartan (ENTRESTO) 49-51 MG per tablet Take 1 tablet by mouth 2 times daily      torsemide (DEMADEX) 20 MG tablet Take 2 tablets by mouth daily      glipiZIDE (GLUCOTROL) 10 MG tablet Take 1 tablet by mouth      empagliflozin (JARDIANCE) 25 MG tablet Take 0.5 tablets by mouth      docusate (COLACE, DULCOLAX) 100 MG CAPS Take 100 mg by mouth         Past History     Past Medical History:  History reviewed. No pertinent past medical history.    Past Surgical History:  History reviewed. No pertinent surgical  history.    Family History:  History reviewed. No pertinent family history.    Social History:       Allergies:  No Known Allergies      Physical Exam     General: Patient is awake and alert, resting comfortably in no acute distress.  Cardiovascular: RRR, no murmurs.  Respiratory: Normal respiratory effort. Lung sounds clear to auscultation.  Skin:                   Neuro: The patient is alert and oriented.  Psych: Appropriate mood and affect.        Lab and Diagnostic Study Results     Labs -  Recent Results (from the past 24 hour(s))   EKG 12 Lead    Collection Time: 11/05/24  7:22 PM   Result Value Ref Range    Ventricular Rate 46 BPM    Atrial Rate 46 BPM    P-R Interval 302 ms    QRS Duration 100 ms    Q-T Interval 446 ms    QTc Calculation (Bazett) 390 ms    P Axis 73 degrees    R Axis -5 degrees    T Axis 192 degrees    Diagnosis       Sinus bradycardia with 1st degree AV block with premature atrial complexes in   a pattern of bigeminy  Nonspecific T wave abnormality  Abnormal ECG  No previous ECGs available     CBC with Auto Differential    Collection Time: 11/05/24  7:30 PM   Result Value Ref Range    WBC 5.4 4.6 - 13.2 K/uL    RBC 4.41 4.35 - 5.65 M/uL    Hemoglobin 11.4 (L) 13.0 - 16.0 g/dL    Hematocrit 36.1 36.0 - 48.0 %    MCV 81.9 78.0 - 100.0 FL    MCH 25.9 24.0 - 34.0 PG    MCHC 31.6 31.0 - 37.0 g/dL    RDW 16.9 (H) 11.6 - 14.5 %    Platelets 168 135 - 420 K/uL    MPV 8.3 (L) 9.2 - 11.8 FL    Nucleated RBCs 0.0 0  WBC    nRBC 0.00 0.00 - 0.01 K/uL    Neutrophils % 66 40 - 73 %    Lymphocytes % 20 (L) 21 - 52 %    Monocytes % 11 (H) 3 - 10 %    Eosinophils % 2 0 - 5 %    Basophils % 1 0 - 2 %    Immature Granulocytes % 0 0.0 - 0.5 %    Neutrophils Absolute 3.5 1.8 - 8.0 K/UL    Lymphocytes Absolute 1.1 0.9 - 3.6 K/UL    Monocytes Absolute 0.6 0.05 - 1.2 K/UL    Eosinophils Absolute 0.1 0.0 - 0.4 K/UL    Basophils Absolute 0.1 0.0 - 0.1 K/UL    Immature Granulocytes Absolute 0.0 0.00 - 0.04

## 2024-11-07 LAB
ALBUMIN SERPL-MCNC: 3.2 G/DL (ref 3.4–5)
ALBUMIN/GLOB SERPL: 0.9 (ref 0.8–1.7)
ALP SERPL-CCNC: 67 U/L (ref 45–117)
ALT SERPL-CCNC: 16 U/L (ref 16–61)
ANION GAP SERPL CALC-SCNC: 6 MMOL/L (ref 3–18)
AST SERPL-CCNC: 13 U/L (ref 10–38)
BACTERIA SPEC CULT: NORMAL
BASOPHILS # BLD: 0 K/UL (ref 0–0.1)
BASOPHILS NFR BLD: 1 % (ref 0–2)
BILIRUB SERPL-MCNC: 0.6 MG/DL (ref 0.2–1)
BUN SERPL-MCNC: 55 MG/DL (ref 7–18)
BUN/CREAT SERPL: 16 (ref 12–20)
CALCIUM SERPL-MCNC: 9.2 MG/DL (ref 8.5–10.1)
CHLORIDE SERPL-SCNC: 109 MMOL/L (ref 100–111)
CO2 SERPL-SCNC: 27 MMOL/L (ref 21–32)
CREAT SERPL-MCNC: 3.53 MG/DL (ref 0.6–1.3)
DIFFERENTIAL METHOD BLD: ABNORMAL
EKG ATRIAL RATE: 63 BPM
EKG ATRIAL RATE: 98 BPM
EKG DIAGNOSIS: NORMAL
EKG DIAGNOSIS: NORMAL
EKG P AXIS: 92 DEGREES
EKG P-R INTERVAL: 306 MS
EKG Q-T INTERVAL: 446 MS
EKG Q-T INTERVAL: 452 MS
EKG QRS DURATION: 102 MS
EKG QRS DURATION: 96 MS
EKG QTC CALCULATION (BAZETT): 381 MS
EKG QTC CALCULATION (BAZETT): 456 MS
EKG R AXIS: -9 DEGREES
EKG R AXIS: 2 DEGREES
EKG T AXIS: 124 DEGREES
EKG T AXIS: 173 DEGREES
EKG VENTRICULAR RATE: 43 BPM
EKG VENTRICULAR RATE: 63 BPM
EOSINOPHIL # BLD: 0.2 K/UL (ref 0–0.4)
EOSINOPHIL NFR BLD: 4 % (ref 0–5)
ERYTHROCYTE [DISTWIDTH] IN BLOOD BY AUTOMATED COUNT: 17.1 % (ref 11.6–14.5)
GLOBULIN SER CALC-MCNC: 3.7 G/DL (ref 2–4)
GLUCOSE BLD STRIP.AUTO-MCNC: 114 MG/DL (ref 70–110)
GLUCOSE BLD STRIP.AUTO-MCNC: 158 MG/DL (ref 70–110)
GLUCOSE BLD STRIP.AUTO-MCNC: 167 MG/DL (ref 70–110)
GLUCOSE BLD STRIP.AUTO-MCNC: 178 MG/DL (ref 70–110)
GLUCOSE SERPL-MCNC: 141 MG/DL (ref 74–99)
HCT VFR BLD AUTO: 36.3 % (ref 36–48)
HGB BLD-MCNC: 11.4 G/DL (ref 13–16)
IMM GRANULOCYTES # BLD AUTO: 0 K/UL (ref 0–0.04)
IMM GRANULOCYTES NFR BLD AUTO: 0 % (ref 0–0.5)
LYMPHOCYTES # BLD: 1 K/UL (ref 0.9–3.6)
LYMPHOCYTES NFR BLD: 20 % (ref 21–52)
MCH RBC QN AUTO: 26.1 PG (ref 24–34)
MCHC RBC AUTO-ENTMCNC: 31.4 G/DL (ref 31–37)
MCV RBC AUTO: 83.3 FL (ref 78–100)
MONOCYTES # BLD: 0.7 K/UL (ref 0.05–1.2)
MONOCYTES NFR BLD: 15 % (ref 3–10)
NEUTS SEG # BLD: 3 K/UL (ref 1.8–8)
NEUTS SEG NFR BLD: 60 % (ref 40–73)
NRBC # BLD: 0 K/UL (ref 0–0.01)
NRBC BLD-RTO: 0 PER 100 WBC
PLATELET # BLD AUTO: 176 K/UL (ref 135–420)
PMV BLD AUTO: 9.6 FL (ref 9.2–11.8)
POTASSIUM SERPL-SCNC: 3.7 MMOL/L (ref 3.5–5.5)
PROT SERPL-MCNC: 6.9 G/DL (ref 6.4–8.2)
RBC # BLD AUTO: 4.36 M/UL (ref 4.35–5.65)
SERVICE CMNT-IMP: NORMAL
SODIUM SERPL-SCNC: 142 MMOL/L (ref 136–145)
WBC # BLD AUTO: 4.9 K/UL (ref 4.6–13.2)

## 2024-11-07 PROCEDURE — 6370000000 HC RX 637 (ALT 250 FOR IP): Performed by: INTERNAL MEDICINE

## 2024-11-07 PROCEDURE — 36415 COLL VENOUS BLD VENIPUNCTURE: CPT

## 2024-11-07 PROCEDURE — 1100000003 HC PRIVATE W/ TELEMETRY

## 2024-11-07 PROCEDURE — 85025 COMPLETE CBC W/AUTO DIFF WBC: CPT

## 2024-11-07 PROCEDURE — 93005 ELECTROCARDIOGRAM TRACING: CPT | Performed by: INTERNAL MEDICINE

## 2024-11-07 PROCEDURE — 6360000002 HC RX W HCPCS: Performed by: INTERNAL MEDICINE

## 2024-11-07 PROCEDURE — 82962 GLUCOSE BLOOD TEST: CPT

## 2024-11-07 PROCEDURE — 2580000003 HC RX 258: Performed by: INTERNAL MEDICINE

## 2024-11-07 PROCEDURE — 93010 ELECTROCARDIOGRAM REPORT: CPT | Performed by: INTERNAL MEDICINE

## 2024-11-07 PROCEDURE — 80053 COMPREHEN METABOLIC PANEL: CPT

## 2024-11-07 PROCEDURE — 94761 N-INVAS EAR/PLS OXIMETRY MLT: CPT

## 2024-11-07 PROCEDURE — 99232 SBSQ HOSP IP/OBS MODERATE 35: CPT | Performed by: INTERNAL MEDICINE

## 2024-11-07 RX ORDER — TORSEMIDE 20 MG/1
20 TABLET ORAL DAILY
Status: DISCONTINUED | OUTPATIENT
Start: 2024-11-07 | End: 2024-11-08 | Stop reason: HOSPADM

## 2024-11-07 RX ADMIN — HEPARIN SODIUM 5000 UNITS: 5000 INJECTION INTRAVENOUS; SUBCUTANEOUS at 13:27

## 2024-11-07 RX ADMIN — SODIUM CHLORIDE, PRESERVATIVE FREE 10 ML: 5 INJECTION INTRAVENOUS at 09:57

## 2024-11-07 RX ADMIN — HEPARIN SODIUM 5000 UNITS: 5000 INJECTION INTRAVENOUS; SUBCUTANEOUS at 06:28

## 2024-11-07 RX ADMIN — CARVEDILOL 37.5 MG: 25 TABLET, FILM COATED ORAL at 09:28

## 2024-11-07 RX ADMIN — CARVEDILOL 37.5 MG: 25 TABLET, FILM COATED ORAL at 21:13

## 2024-11-07 RX ADMIN — ASPIRIN 81 MG: 81 TABLET, COATED ORAL at 09:29

## 2024-11-07 RX ADMIN — SODIUM CHLORIDE, PRESERVATIVE FREE 10 ML: 5 INJECTION INTRAVENOUS at 21:19

## 2024-11-07 RX ADMIN — HEPARIN SODIUM 5000 UNITS: 5000 INJECTION INTRAVENOUS; SUBCUTANEOUS at 21:15

## 2024-11-07 RX ADMIN — TORSEMIDE 20 MG: 20 TABLET ORAL at 09:56

## 2024-11-07 RX ADMIN — AMLODIPINE BESYLATE 10 MG: 10 TABLET ORAL at 09:29

## 2024-11-07 RX ADMIN — ATORVASTATIN CALCIUM 20 MG: 20 TABLET, FILM COATED ORAL at 21:13

## 2024-11-07 ASSESSMENT — PAIN SCALES - GENERAL
PAINLEVEL_OUTOF10: 0

## 2024-11-07 NOTE — PROGRESS NOTES
[facility]  Progress Note    Patient: Samir Martino Age: 69 y.o. : 1955 MR#: [id number] SSN: [id number]  Date/Time: 2024     Subjective:   Patient doing well overall. No pain complaints. Kidney function stable.    Dispo;   DC tomorrow    Review of systems  General: No fevers or chills.  Cardiovascular: No chest pain or pressure. No palpitations.   Pulmonary: No shortness of breath, cough or wheeze.   Gastrointestinal: No abdominal pain, nausea, vomiting or diarrhea.   Genitourinary: No urinary frequency, urgency, hesitancy or dysuria.   Musculoskeletal: No joint or muscle pain, no back pain, no recent trauma.    Neurologic: No headache, numbness, tingling or weakness.   Assessment/Plan:   Possible heart failure(HFpEF)  Moderate pulmonary hypertension  Lower extremity edema  UTI  Type 2 diabetes  Hypertension  Hyperlipidemia  CKD stage IV     Admitted to [facility].  Cardiac monitoring  Strict I's and O's, daily weights, fluid restriction  Venous duplex scan of the lower extremities negative for DVT  Echocardiogram noted normal EF with moderate pulmonary hypertension and basal hypokinesis.  Cardiology following  Continue patient on Rocephin  Follow urine culture  Nephrology following for ARF vs advanced CKD. Mild proteinuria, no concern for nephrotic/nephritic syndrome.  Continue norvasc  Continue aspirin 81 mg  Coreg 37.5 mg BID  Torsemide 20 mg    I spent 40 minutes with the patient in face-to-face consultation, of which greater than 50% was spent in counseling and coordination of care as described above.     Case discussed with:  [x]Patient  []Family  []Nursing  []Case Management  DVT Prophylaxis:  []Lovenox  [x]Hep SQ  []SCDs  []Coumadin   []Eliquis/Xarelto     Objective:   VS: BP (!) 157/80   Pulse 69   Temp 98.2 °F (36.8 °C) (Oral)   Resp 18   Ht 1.88 m (6' 2\")   Wt 80.2 kg (176 lb 12.9 oz)   SpO2 95%   BMI 22.70 kg/m²    Tmax/24hrs: Temp (24hrs),  glucagon injection 1 mg  1 mg SubCUTAneous PRN    dextrose 10 % infusion   IntraVENous Continuous PRN    insulin lispro (HUMALOG,ADMELOG) injection vial 0-8 Units  0-8 Units SubCUTAneous 4x Daily AC & HS    heparin (porcine) injection 5,000 Units  5,000 Units SubCUTAneous 3 times per day    hydrALAZINE (APRESOLINE) injection 10 mg  10 mg IntraVENous Q6H PRN       Labs:    Recent Results (from the past 24 hour(s))   POCT Glucose    Collection Time: 11/06/24  8:36 PM   Result Value Ref Range    POC Glucose 168 (H) 70 - 110 mg/dL   CBC with Auto Differential    Collection Time: 11/07/24  3:48 AM   Result Value Ref Range    WBC 4.9 4.6 - 13.2 K/uL    RBC 4.36 4.35 - 5.65 M/uL    Hemoglobin 11.4 (L) 13.0 - 16.0 g/dL    Hematocrit 36.3 36.0 - 48.0 %    MCV 83.3 78.0 - 100.0 FL    MCH 26.1 24.0 - 34.0 PG    MCHC 31.4 31.0 - 37.0 g/dL    RDW 17.1 (H) 11.6 - 14.5 %    Platelets 176 135 - 420 K/uL    MPV 9.6 9.2 - 11.8 FL    Nucleated RBCs 0.0 0  WBC    nRBC 0.00 0.00 - 0.01 K/uL    Neutrophils % 60 40 - 73 %    Lymphocytes % 20 (L) 21 - 52 %    Monocytes % 15 (H) 3 - 10 %    Eosinophils % 4 0 - 5 %    Basophils % 1 0 - 2 %    Immature Granulocytes % 0 0.0 - 0.5 %    Neutrophils Absolute 3.0 1.8 - 8.0 K/UL    Lymphocytes Absolute 1.0 0.9 - 3.6 K/UL    Monocytes Absolute 0.7 0.05 - 1.2 K/UL    Eosinophils Absolute 0.2 0.0 - 0.4 K/UL    Basophils Absolute 0.0 0.0 - 0.1 K/UL    Immature Granulocytes Absolute 0.0 0.00 - 0.04 K/UL    Differential Type AUTOMATED     Comprehensive Metabolic Panel    Collection Time: 11/07/24  3:48 AM   Result Value Ref Range    Sodium 142 136 - 145 mmol/L    Potassium 3.7 3.5 - 5.5 mmol/L    Chloride 109 100 - 111 mmol/L    CO2 27 21 - 32 mmol/L    Anion Gap 6 3.0 - 18 mmol/L    Glucose 141 (H) 74 - 99 mg/dL    BUN 55 (H) 7.0 - 18 MG/DL    Creatinine 3.53 (H) 0.6 - 1.3 MG/DL    BUN/Creatinine Ratio 16 12 - 20      Est, Glom Filt Rate 18 (L) >60 ml/min/1.73m2    Calcium 9.2 8.5 - 10.1 MG/DL

## 2024-11-07 NOTE — PLAN OF CARE
Problem: Pain  Goal: Verbalizes/displays adequate comfort level or baseline comfort level  Outcome: Progressing  Flowsheets (Taken 11/7/2024 1010)  Verbalizes/displays adequate comfort level or baseline comfort level:   Encourage patient to monitor pain and request assistance   Assess pain using appropriate pain scale     Problem: Safety - Adult  Goal: Free from fall injury  Outcome: Progressing  Flowsheets (Taken 11/7/2024 1010)  Free From Fall Injury: Instruct family/caregiver on patient safety     Problem: Chronic Conditions and Co-morbidities  Goal: Patient's chronic conditions and co-morbidity symptoms are monitored and maintained or improved  11/7/2024 1010 by Charlotte Arora RN  Outcome: Progressing  Flowsheets (Taken 11/7/2024 1010)  Care Plan - Patient's Chronic Conditions and Co-Morbidity Symptoms are Monitored and Maintained or Improved:   Monitor and assess patient's chronic conditions and comorbid symptoms for stability, deterioration, or improvement   Update acute care plan with appropriate goals if chronic or comorbid symptoms are exacerbated and prevent overall improvement and discharge  11/7/2024 0442 by Nannette Prince RN  Outcome: Progressing  Note: Educate the pt in the importance of compliance to fluid restriction of 1200 in 24 hours   11/7/2024 0439 by Nannette Prince RN  Outcome: Progressing  Note: Monitr intake and pts output and  pts daily weights and document.educate the pt on maintaining the 1800 fluid restiction in 24 hours

## 2024-11-07 NOTE — PROGRESS NOTES
4 Eyes Skin Assessment     NAME:  Samir Martino  YOB: 1955  MEDICAL RECORD NUMBER:  [id number]    The patient is being assessed for  Shift Handoff    I agree that at least one RN has performed a thorough Head to Toe Skin Assessment on the patient. ALL assessment sites listed below have been assessed.      Areas assessed by both nurses:    Head, Face, Ears, Shoulders, Back, Chest, Arms, Elbows, Hands, Sacrum. Buttock, Coccyx, Ischium, Legs. Feet and Heels, and Under Medical Devices         Does the Patient have a Wound? No noted wound(s)       Jerad Prevention initiated by RN: Yes  Wound Care Orders initiated by RN: No    Pressure Injury (Stage 3,4, Unstageable, DTI, NWPT, and Complex wounds) if present, place Wound referral order by RN under : No    New Ostomies, if present place, Ostomy referral order under : No     Nurse 1 eSignature: Electronically signed by Nannette Prince RN on 11/6/24 at 9:08 PM EST    **SHARE this note so that the co-signing nurse can place an eSignature**    Nurse 2 eSignature: {Esignature:[id number]} EYES

## 2024-11-07 NOTE — PROGRESS NOTES
[facility] - Progress Note    Admit Date: 11/5/2024  Attending Cardiologist: Dr. Jurado     Assessment:     Patient Active Problem List    Diagnosis Date Noted    Complicated UTI (urinary tract infection) 11/06/2024    Chronic kidney disease (CKD), stage III (moderate) ([facility]) 11/06/2024    Hypertension 11/06/2024    Hyperlipidemia 11/06/2024    Type 2 diabetes mellitus with stage 3 chronic kidney disease, without long-term current use of insulin ([facility]) 11/06/2024    Acute on chronic heart failure ([facility]) 11/06/2024    Acute renal injury ([facility]) 11/05/2024     - HFmrEF: echo this admission shows EF 50-55%, mild global hypokinesis present. Unknown prior EF, [facility]. Patient presented with BLE swelling. proBNP 1,956. CXR read as early cardiogenic pulmonary edema.  GDMT: coreg 37.5 mg BID, entresto 49/51 mg, torsemide 40 mg daily, jardiance 12.5 mg daily   - Moderate pulmonary hypertension per echo  - GARCIA: Cr 3.8, baseline creatinine in the 3 range, nephrology following.   - UTI: on abx  - HTN: currently uncontrolled, on norvasc 10 mg   - HLD: on atorvastatin      Primary cardiologist: none on file, [facility]. Initial consult [facility], Dr. Azevedo.    Plan:     Addendum: Independently seen and evaluated.  Agree with below.  Would continue to gently diurese.  He remains quite hypertensive.  Will increase GDMT as needed.  Appreciate nephrology management of fluid status.    - Volume management per nephrology, restarting torsemide today   - GDMT: Coreg 37.5 mg. Entresto and Jardiance on hold, resume when okay with nephrology.   - Strict I/Os, daily weight, fluid restriction     Subjective:     No new complaints.     Objective:      Patient Vitals for the past 8 hrs:   Temp Pulse Resp BP SpO2   11/07/24 0928 -- 74 -- (!) 172/92 --   11/07/24 0745 97.9 °F (36.6 °C) 74 18 (!) 172/92 98 %   11/07/24 0428 98 °F (36.7 °C) 68 18 (!) 166/90 99 %         Patient Vitals for the past 96

## 2024-11-07 NOTE — PROGRESS NOTES
In Patient Progress note      Admit Date: 11/5/2024      Impression:     #1 Chronic kidney disease stage IV, etiology diabetic nephropathy, baseline creatinine in the 3 range, creatinine [facility] 3.3 [facility].  He does have subnephrotic proteinuria as well.  Presently his volume status does not appear too overloaded.  Does not warrant aggressive diuresis as able to lay flat with no acute distress,Mild edema.  #2 hypertension uncontrolled  #3 diabetes mellitus type 2 with complications  #4 heart failure with mildly reduced EF,\" mild global hypokinesis  #5 moderate pulmonary hypertension  #6 dyslipidemia     Plan:     #1 strict intake output charting  #2 restart low dose torsemide   #3 holding Entresto, defer to cards and primary team  #4 hemoglobin is more than 10 so no need of EVELYNE  #5 goal blood pressures of 130s to 150s systolic in acute setting  #7 avoid IV contrast nephrotoxins renally dose medications for EGFR of less than 20  #8  No uremic symptoms, no acute indication for dialysis       Discussed plan with primary team and cardiology    F/u with nephro in few weeks after discharge      Please call with questions     Abdi Espinosa MD FASN  Cell [phone]  Pager: [phone]  Fax   [phone]   Subjective:     - No acute over night events.  - respiratory - stable  - hemodynamics - stable, no pressrs  - UOP-  - Nutrition -    Objective:     BP (!) 146/80   Pulse 72   Temp 98 °F (36.7 °C) (Oral)   Resp 18   Ht 1.88 m (6' 2\")   Wt 80.2 kg (176 lb 12.9 oz)   SpO2 98%   BMI 22.70 kg/m²       Intake/Output Summary (Last 24 hours) at 11/7/2024 1318  Last data filed at 11/7/2024 0959  Gross per 24 hour   Intake 1520 ml   Output 3450 ml   Net -1930 ml       Physical Exam:     Gen NAD  HENT mmm  RS AEBE   CVS s1s2 wnl no JVD  Ext edema +  Skin no rashes  Neuro     Data Review:    Recent Labs     11/07/24  0348   WBC 4.9   RBC 4.36   HCT 36.3   MCV 83.3   MCH 26.1   MCHC 31.4   RDW 17.1*   MPV 9.6

## 2024-11-07 NOTE — PLAN OF CARE
Problem: Chronic Conditions and Co-morbidities  Goal: Patient's chronic conditions and co-morbidity symptoms are monitored and maintained or improved  11/7/2024 0442 by Nannette Prince RN  Outcome: Progressing  Note: Educate the pt in the importance of compliance to fluid restriction of 1200 in 24 hours   11/7/2024 0439 by Nannette Prince RN  Outcome: Progressing  Note: Monitr intake and pts output and  pts daily weights and document.educate the pt on maintaining the 1800 fluid restiction in 24 hours

## 2024-11-08 VITALS
HEIGHT: 74 IN | WEIGHT: 176.81 LBS | HEART RATE: 57 BPM | DIASTOLIC BLOOD PRESSURE: 81 MMHG | BODY MASS INDEX: 22.69 KG/M2 | SYSTOLIC BLOOD PRESSURE: 154 MMHG | OXYGEN SATURATION: 98 % | RESPIRATION RATE: 18 BRPM | TEMPERATURE: 97.7 F

## 2024-11-08 LAB
ANION GAP SERPL CALC-SCNC: 5 MMOL/L (ref 3–18)
BASOPHILS # BLD: 0 K/UL (ref 0–0.1)
BASOPHILS NFR BLD: 1 % (ref 0–2)
BUN SERPL-MCNC: 54 MG/DL (ref 7–18)
BUN/CREAT SERPL: 15 (ref 12–20)
CALCIUM SERPL-MCNC: 9.2 MG/DL (ref 8.5–10.1)
CHLORIDE SERPL-SCNC: 107 MMOL/L (ref 100–111)
CO2 SERPL-SCNC: 27 MMOL/L (ref 21–32)
CREAT SERPL-MCNC: 3.51 MG/DL (ref 0.6–1.3)
DIFFERENTIAL METHOD BLD: ABNORMAL
EOSINOPHIL # BLD: 0.2 K/UL (ref 0–0.4)
EOSINOPHIL NFR BLD: 3 % (ref 0–5)
ERYTHROCYTE [DISTWIDTH] IN BLOOD BY AUTOMATED COUNT: 17 % (ref 11.6–14.5)
GLUCOSE BLD STRIP.AUTO-MCNC: 134 MG/DL (ref 70–110)
GLUCOSE BLD STRIP.AUTO-MCNC: 136 MG/DL (ref 70–110)
GLUCOSE SERPL-MCNC: 133 MG/DL (ref 74–99)
HCT VFR BLD AUTO: 37.6 % (ref 36–48)
HGB BLD-MCNC: 11.6 G/DL (ref 13–16)
IMM GRANULOCYTES # BLD AUTO: 0 K/UL (ref 0–0.04)
IMM GRANULOCYTES NFR BLD AUTO: 0 % (ref 0–0.5)
LYMPHOCYTES # BLD: 1.1 K/UL (ref 0.9–3.6)
LYMPHOCYTES NFR BLD: 23 % (ref 21–52)
MCH RBC QN AUTO: 25.7 PG (ref 24–34)
MCHC RBC AUTO-ENTMCNC: 30.9 G/DL (ref 31–37)
MCV RBC AUTO: 83.2 FL (ref 78–100)
MONOCYTES # BLD: 0.6 K/UL (ref 0.05–1.2)
MONOCYTES NFR BLD: 13 % (ref 3–10)
NEUTS SEG # BLD: 2.8 K/UL (ref 1.8–8)
NEUTS SEG NFR BLD: 59 % (ref 40–73)
NRBC # BLD: 0 K/UL (ref 0–0.01)
NRBC BLD-RTO: 0 PER 100 WBC
PLATELET # BLD AUTO: 175 K/UL (ref 135–420)
PMV BLD AUTO: 9.7 FL (ref 9.2–11.8)
POTASSIUM SERPL-SCNC: 3.5 MMOL/L (ref 3.5–5.5)
RBC # BLD AUTO: 4.52 M/UL (ref 4.35–5.65)
SODIUM SERPL-SCNC: 139 MMOL/L (ref 136–145)
WBC # BLD AUTO: 4.8 K/UL (ref 4.6–13.2)

## 2024-11-08 PROCEDURE — 6370000000 HC RX 637 (ALT 250 FOR IP): Performed by: INTERNAL MEDICINE

## 2024-11-08 PROCEDURE — 6370000000 HC RX 637 (ALT 250 FOR IP)

## 2024-11-08 PROCEDURE — 2580000003 HC RX 258: Performed by: INTERNAL MEDICINE

## 2024-11-08 PROCEDURE — 85025 COMPLETE CBC W/AUTO DIFF WBC: CPT

## 2024-11-08 PROCEDURE — 6360000002 HC RX W HCPCS: Performed by: INTERNAL MEDICINE

## 2024-11-08 PROCEDURE — 94761 N-INVAS EAR/PLS OXIMETRY MLT: CPT

## 2024-11-08 PROCEDURE — 80048 BASIC METABOLIC PNL TOTAL CA: CPT

## 2024-11-08 PROCEDURE — 36415 COLL VENOUS BLD VENIPUNCTURE: CPT

## 2024-11-08 PROCEDURE — 99239 HOSP IP/OBS DSCHRG MGMT >30: CPT | Performed by: FAMILY MEDICINE

## 2024-11-08 PROCEDURE — 82962 GLUCOSE BLOOD TEST: CPT

## 2024-11-08 RX ORDER — CARVEDILOL 25 MG/1
25 TABLET ORAL 2 TIMES DAILY
Status: DISCONTINUED | OUTPATIENT
Start: 2024-11-08 | End: 2024-11-08 | Stop reason: HOSPADM

## 2024-11-08 RX ORDER — TORSEMIDE 20 MG/1
20 TABLET ORAL DAILY
Qty: 30 TABLET | Refills: 0 | Status: SHIPPED | OUTPATIENT
Start: 2024-11-08

## 2024-11-08 RX ADMIN — TORSEMIDE 20 MG: 20 TABLET ORAL at 08:56

## 2024-11-08 RX ADMIN — ASPIRIN 81 MG: 81 TABLET, COATED ORAL at 08:56

## 2024-11-08 RX ADMIN — SODIUM CHLORIDE, PRESERVATIVE FREE 10 ML: 5 INJECTION INTRAVENOUS at 09:18

## 2024-11-08 RX ADMIN — HYDRALAZINE HYDROCHLORIDE 10 MG: 20 INJECTION INTRAMUSCULAR; INTRAVENOUS at 09:18

## 2024-11-08 RX ADMIN — HEPARIN SODIUM 5000 UNITS: 5000 INJECTION INTRAVENOUS; SUBCUTANEOUS at 07:00

## 2024-11-08 RX ADMIN — CARVEDILOL 25 MG: 25 TABLET, FILM COATED ORAL at 08:56

## 2024-11-08 RX ADMIN — AMLODIPINE BESYLATE 10 MG: 10 TABLET ORAL at 08:56

## 2024-11-08 ASSESSMENT — PAIN SCALES - GENERAL: PAINLEVEL_OUTOF10: 0

## 2024-11-08 NOTE — PLAN OF CARE
Problem: Chronic Conditions and Co-morbidities  Goal: Patient's chronic conditions and co-morbidity symptoms are monitored and maintained or improved  11/8/2024 1205 by Kia Rowley RN  Outcome: Adequate for Discharge  Flowsheets (Taken 11/8/2024 0756 by Dolores Levin, RN)  Care Plan - Patient's Chronic Conditions and Co-Morbidity Symptoms are Monitored and Maintained or Improved:   Monitor and assess patient's chronic conditions and comorbid symptoms for stability, deterioration, or improvement   Update acute care plan with appropriate goals if chronic or comorbid symptoms are exacerbated and prevent overall improvement and discharge     Problem: Safety - Adult  Goal: Free from fall injury  11/8/2024 1205 by Kia Rowley RN  Outcome: Adequate for Discharge  Flowsheets (Taken 11/8/2024 0138 by Dolores Levin RN)  Free From Fall Injury: Instruct family/caregiver on patient safety  Note: Nurse will ensure patients bed is in lowest position, wheels locked and call light within reach at all times.   Nurse will instruct patient to use call bell for assistance with any needs.      Problem: Cardiovascular - Adult  Goal: Maintains optimal cardiac output and hemodynamic stability  11/8/2024 1205 by Kia Rowley RN  Outcome: Adequate for Discharge  Flowsheets (Taken 11/8/2024 1205)  Maintains optimal cardiac output and hemodynamic stability:   Monitor blood pressure and heart rate   Monitor urine output and notify Licensed Independent Practitioner for values outside of normal range   Assess for signs of decreased cardiac output   Administer vasoactive medications as ordered     Problem: Cardiovascular - Adult  Goal: Absence of cardiac dysrhythmias or at baseline  11/8/2024 1205 by Kia Rowley RN  Outcome: Adequate for Discharge  Flowsheets (Taken 11/8/2024 1205)  Absence of cardiac dysrhythmias or at baseline:   Monitor cardiac rate and rhythm   Assess for signs of decreased cardiac output    replacement as ordered   Monitor response to electrolyte replacements, including repeat lab results as appropriate   Fluid restriction as ordered     Problem: Metabolic/Fluid and Electrolytes - Adult  Goal: Hemodynamic stability and optimal renal function maintained  Outcome: Adequate for Discharge  Flowsheets (Taken 11/8/2024 1205)  Hemodynamic stability and optimal renal function maintained:   Monitor labs and assess for signs and symptoms of volume excess or deficit   Monitor intake, output and patient weight   Monitor urine specific gravity, serum osmolarity and serum sodium as indicated or ordered   Monitor response to interventions for patient's volume status, including labs, urine output, blood pressure (other measures as available)   Encourage oral intake as appropriate   Instruct patient on fluid and nutrition restrictions as appropriate  Note: Nurse will educate patient about his 1800 mL fluid restriction     Problem: Metabolic/Fluid and Electrolytes - Adult  Goal: Glucose maintained within prescribed range  Outcome: Adequate for Discharge  Flowsheets (Taken 11/8/2024 1205)  Glucose maintained within prescribed range:   Monitor blood glucose as ordered   Assess for signs and symptoms of hyperglycemia and hypoglycemia   Administer ordered medications to maintain glucose within target range   Assess barriers to adequate nutritional intake and initiate nutrition consult as needed   Instruct patient on self management of diabetes and initiate consult as needed     Problem: Hematologic - Adult  Goal: Maintains hematologic stability  Outcome: Adequate for Discharge  Flowsheets (Taken 11/8/2024 1205)  Maintains hematologic stability:   Assess for signs and symptoms of bleeding or hemorrhage   Monitor labs for bleeding or clotting disorders   Administer blood products/factors as ordered     Problem: Skin/Tissue Integrity - Adult  Goal: Incisions, wounds, or drain sites healing without S/S of infection  Outcome:

## 2024-11-08 NOTE — PLAN OF CARE
Problem: Safety - Adult  Goal: Free from fall injury  11/8/2024 0756 by Dolores Levin RN  Outcome: Progressing  Note: Patient up ad miesha, yellow socks on, call bell within reach.     Problem: Safety - Adult  Goal: Free from fall injury  11/8/2024 0755 by Dolores eLvin RN  Outcome: Progressing     Problem: Safety - Adult  Goal: Free from fall injury  11/8/2024 0138 by Dolores Levin RN  Outcome: Progressing  Flowsheets (Taken 11/8/2024 0138)  Free From Fall Injury: Instruct family/caregiver on patient safety     Problem: Chronic Conditions and Co-morbidities  Goal: Patient's chronic conditions and co-morbidity symptoms are monitored and maintained or improved  11/8/2024 0756 by Dolores Levin RN  Outcome: Progressing  Flowsheets (Taken 11/8/2024 0756)  Care Plan - Patient's Chronic Conditions and Co-Morbidity Symptoms are Monitored and Maintained or Improved:   Monitor and assess patient's chronic conditions and comorbid symptoms for stability, deterioration, or improvement   Update acute care plan with appropriate goals if chronic or comorbid symptoms are exacerbated and prevent overall improvement and discharge  Note: Patient has CHF and CKD3. Nephrology consulted.     Problem: Cardiovascular - Adult  Goal: Maintains optimal cardiac output and hemodynamic stability  11/8/2024 0756 by Dolores Levin RN  Outcome: Progressing  Flowsheets (Taken 11/8/2024 0756)  Maintains optimal cardiac output and hemodynamic stability:   Monitor blood pressure and heart rate   Administer fluid and/or volume expanders as ordered  Note: Cardiology following.     Problem: Cardiovascular - Adult  Goal: Maintains optimal cardiac output and hemodynamic stability  11/8/2024 0755 by Dolores Levin RN  Outcome: Progressing     Problem: Cardiovascular - Adult  Goal: Absence of cardiac dysrhythmias or at baseline  11/8/2024 0756 by Dolores Levin RN  Outcome: Progressing  Flowsheets

## 2024-11-08 NOTE — PLAN OF CARE
Problem: Safety - Adult  Goal: Free from fall injury  Outcome: Progressing  Flowsheets (Taken 11/8/2024 0138)  Free From Fall Injury: Instruct family/caregiver on patient safety     Problem: Chronic Conditions and Co-morbidities  Goal: Patient's chronic conditions and co-morbidity symptoms are monitored and maintained or improved  Outcome: Progressing  Flowsheets (Taken 11/8/2024 0138)  Care Plan - Patient's Chronic Conditions and Co-Morbidity Symptoms are Monitored and Maintained or Improved:   Monitor and assess patient's chronic conditions and comorbid symptoms for stability, deterioration, or improvement   Collaborate with multidisciplinary team to address chronic and comorbid conditions and prevent exacerbation or deterioration

## 2024-11-08 NOTE — PROGRESS NOTES
Discharge orders received for the patient.    Discharge paperwork presented to the patient. All questions answered to patients satisfaction.    PIV and telemetry leads removed without complication.    Armbands removed and shredded.     Patient escorted to main entrance via wheelchair and transported home via his sister.

## 2024-11-08 NOTE — PROGRESS NOTES
[facility] - Progress Note    Admit Date: 11/5/2024  Attending Cardiologist: Dr. Azevedo    Assessment:     Patient Active Problem List    Diagnosis Date Noted    Complicated UTI (urinary tract infection) 11/06/2024    Chronic kidney disease (CKD), stage III (moderate) ([facility]) 11/06/2024    Hypertension 11/06/2024    Hyperlipidemia 11/06/2024    Type 2 diabetes mellitus with stage 3 chronic kidney disease, without long-term current use of insulin ([facility]) 11/06/2024    Acute on chronic heart failure ([facility]) 11/06/2024    Acute renal injury ([facility]) 11/05/2024       - HFmrEF: echo this admission shows EF 50-55%, mild global hypokinesis present. Unknown prior EF, records follows with [facility]. Patient presented with BLE swelling. proBNP 1,956. CXR read as early cardiogenic pulmonary edema.  GDMT: coreg 37.5 mg BID, entresto 49/51 mg, torsemide 40 mg daily, jardiance 12.5 mg daily   - Moderate pulmonary hypertension per echo  - GARCIA: Cr 3.8, baseline creatinine in the 3 range, nephrology following.   - UTI: on abx  - HTN: currently uncontrolled, on norvasc 10 mg   - HLD: on atorvastatin      Primary cardiologist: none on file, [facility] follows with [facility] for [facility]CP. Initial consult [facility], Dr. Azevedo.    Plan:     - Volume management per nephrology  - Decreased coreg to 25mg BID with episodes of bradycardia.   - GDMT with torsemide and coreg. Can optimize further GDMT as outpatient   - No further cardiac recommendations. Will arrange outpatient follow up.     Subjective:     No new complaints.     Objective:      Patient Vitals for the past 8 hrs:   Temp Pulse Resp BP SpO2   11/08/24 1130 97.7 °F (36.5 °C) 57 18 (!) 154/81 98 %   11/08/24 0856 -- 64 -- (!) 176/94 --   11/08/24 0800 98 °F (36.7 °C) 64 17 (!) 176/94 94 %   11/08/24 0438 98.3 °F (36.8 °C) 77 16 (!) 153/85 94 %         Patient Vitals for the past 96 hrs:   Weight   11/07/24 0600 80.2 kg (176 lb 12.9 oz)   11/06/24 1006 79.8 kg (176 lb)   11/05/24 1858 79.8 kg

## 2024-11-11 PROBLEM — N30.00 ACUTE CYSTITIS WITHOUT HEMATURIA: Status: ACTIVE | Noted: 2024-11-11

## 2024-11-11 PROBLEM — R52 PAIN: Status: ACTIVE | Noted: 2024-11-11

## 2024-11-18 NOTE — DISCHARGE SUMMARY
Discharge Summary    Patient: Samir Martino MRN: [id number]  [facility]: [id number]    YOB: 1955  Age: 69 y.o.  Sex: male    DOA: 11/5/2024 LOS:  LOS: 3 days   Discharge Date: 11/8/2024     Admission Diagnoses: Acute pulmonary edema [J81.0]  Pain [R52]  Acute cystitis without hematuria [N30.00]  Acute renal injury (HCC) [N17.9]  Acute renal failure superimposed on stage 4 chronic kidney disease, unspecified acute renal failure type (HCC) [N17.9, N18.4]    Discharge Diagnoses:    CHF, MR EF  Pulmonary hypertension  DM2  Hypertension  Hyperlipidemia  CKD 4    Discharge Condition: Stable    PHYSICAL EXAM  Visit Vitals  BP (!) 154/81   Pulse 57   Temp 97.7 °F (36.5 °C) (Oral)   Resp 18   Ht 1.88 m (6' 2\")   Wt 80.2 kg (176 lb 12.9 oz)   SpO2 98%   BMI 22.70 kg/m²       General: In NAD.  Nontoxic-appearing.  HEENT: NCAT.  Sclerae anicteric, EOMI.  OP clear.  Lungs:  Clear, no wheezes.  No accessory muscle use.  Heart:  RRR.  Abdomen: Soft, NT/ND.  Extremities: Warm, no pitting edema or ischemia.  Psych:   Mood normal.  Neurologic:  Awake and alert, moves extremities spontaneously.  Motor grossly nonfocal.    Hospital Course:   See admission H&P for full detail of HPI.  Patient is admitted to the hospital after presenting to the ED for evaluation of progressively worsening lower extremity swelling.  Symptoms were consistent with CHF and appropriate therapy was initiated.  Cardiology evaluation is obtained.  Patient has responded well to diuresis.  He continues to tolerate GDMT as initiated.  He is felt to have met max benefit of hospitalization and is medically stable for discharge home with outpatient follow-up as advised.    Consults:   Cardiology    Significant Diagnostic Studies/Procedures:     CT ABDOMEN PELVIS WO CONTRAST: 11/5/2024 8:48 PM     CLINICAL INFORMATION  Acute renal injury? unclear etiology, no flank pain.     COMPARISON  None.     TECHNIQUE  Thin section noncontrast axial CT images of the

## 2024-12-11 PROBLEM — R52 PAIN: Status: RESOLVED | Noted: 2024-11-11 | Resolved: 2024-12-11

## 2025-08-29 ENCOUNTER — HOSPITAL ENCOUNTER (INPATIENT)
Facility: HOSPITAL | Age: 70
LOS: 2 days | Discharge: HOME | DRG: 641 | End: 2025-08-31
Attending: STUDENT IN AN ORGANIZED HEALTH CARE EDUCATION/TRAINING PROGRAM | Admitting: STUDENT IN AN ORGANIZED HEALTH CARE EDUCATION/TRAINING PROGRAM
Payer: OTHER GOVERNMENT

## 2025-08-29 ENCOUNTER — APPOINTMENT (OUTPATIENT)
Age: 70
End: 2025-08-29
Attending: STUDENT IN AN ORGANIZED HEALTH CARE EDUCATION/TRAINING PROGRAM
Payer: OTHER GOVERNMENT

## 2025-08-29 ENCOUNTER — APPOINTMENT (OUTPATIENT)
Age: 70
End: 2025-08-29
Payer: OTHER GOVERNMENT

## 2025-08-29 ENCOUNTER — HOSPITAL ENCOUNTER (EMERGENCY)
Age: 70
Discharge: ANOTHER HOSPITAL | End: 2025-08-29
Attending: STUDENT IN AN ORGANIZED HEALTH CARE EDUCATION/TRAINING PROGRAM
Payer: OTHER GOVERNMENT

## 2025-08-29 VITALS
OXYGEN SATURATION: 97 % | SYSTOLIC BLOOD PRESSURE: 142 MMHG | DIASTOLIC BLOOD PRESSURE: 71 MMHG | RESPIRATION RATE: 17 BRPM | HEART RATE: 51 BPM | HEIGHT: 74 IN | WEIGHT: 260 LBS | TEMPERATURE: 98.4 F | BODY MASS INDEX: 33.37 KG/M2

## 2025-08-29 DIAGNOSIS — N18.5 STAGE 5 CHRONIC KIDNEY DISEASE NOT ON CHRONIC DIALYSIS (HCC): Primary | ICD-10-CM

## 2025-08-29 DIAGNOSIS — N17.9 ACUTE RENAL FAILURE, UNSPECIFIED ACUTE RENAL FAILURE TYPE: Primary | ICD-10-CM

## 2025-08-29 PROBLEM — N19 RENAL FAILURE: Status: ACTIVE | Noted: 2025-08-29

## 2025-08-29 LAB
ALBUMIN SERPL-MCNC: 3.5 G/DL (ref 3.4–5)
ALBUMIN/GLOB SERPL: 0.7 (ref 1–1.9)
ALP SERPL-CCNC: 84 U/L (ref 45–117)
ALT SERPL-CCNC: 21 U/L (ref 10–50)
ANION GAP SERPL CALC-SCNC: 21 MMOL/L (ref 7–16)
APPEARANCE UR: CLEAR
AST SERPL-CCNC: 18 U/L (ref 10–38)
BACTERIA URNS QL MICRO: NEGATIVE /HPF
BASOPHILS # BLD: 0.04 K/UL (ref 0–0.1)
BASOPHILS NFR BLD: 0.9 % (ref 0–2)
BILIRUB SERPL-MCNC: 0.6 MG/DL (ref 0.2–1)
BILIRUB UR QL: NEGATIVE
BUN SERPL-MCNC: 112 MG/DL (ref 6–23)
BUN/CREAT SERPL: 14
CALCIUM SERPL-MCNC: 9.1 MG/DL (ref 8.5–10.1)
CHLORIDE SERPL-SCNC: 86 MMOL/L (ref 98–107)
CO2 SERPL-SCNC: 30 MMOL/L (ref 21–32)
COLOR UR: YELLOW
CREAT SERPL-MCNC: 7.88 MG/DL (ref 0.6–1.3)
DIFFERENTIAL METHOD BLD: ABNORMAL
ECHO BSA: 2.48 M2
EKG ATRIAL RATE: 66 BPM
EKG DIAGNOSIS: NORMAL
EKG P-R INTERVAL: 258 MS
EKG Q-T INTERVAL: 454 MS
EKG QRS DURATION: 108 MS
EKG QTC CALCULATION (BAZETT): 457 MS
EKG R AXIS: 14 DEGREES
EKG T AXIS: -45 DEGREES
EKG VENTRICULAR RATE: 61 BPM
EOSINOPHIL # BLD: 0.07 K/UL (ref 0–0.4)
EOSINOPHIL NFR BLD: 1.5 % (ref 0–5)
ERYTHROCYTE [DISTWIDTH] IN BLOOD BY AUTOMATED COUNT: 13.2 % (ref 11.6–14.5)
GLOBULIN SER CALC-MCNC: 5.2 G/DL (ref 2.3–3.5)
GLUCOSE BLD STRIP.AUTO-MCNC: 194 MG/DL (ref 70–110)
GLUCOSE SERPL-MCNC: 184 MG/DL (ref 74–108)
GLUCOSE UR STRIP.AUTO-MCNC: NEGATIVE MG/DL
HCT VFR BLD AUTO: 37.8 % (ref 36–48)
HGB BLD-MCNC: 12.4 G/DL (ref 13–16)
HGB UR QL STRIP: ABNORMAL
IMM GRANULOCYTES # BLD AUTO: 0.01 K/UL (ref 0–0.04)
IMM GRANULOCYTES NFR BLD AUTO: 0.2 % (ref 0–0.5)
KETONES UR QL STRIP.AUTO: NEGATIVE MG/DL
LEUKOCYTE ESTERASE UR QL STRIP.AUTO: NEGATIVE
LYMPHOCYTES # BLD: 0.63 K/UL (ref 0.9–3.6)
LYMPHOCYTES NFR BLD: 13.9 % (ref 21–52)
MAGNESIUM SERPL-MCNC: 2.2 MG/DL (ref 1.6–2.6)
MCH RBC QN AUTO: 27.1 PG (ref 24–34)
MCHC RBC AUTO-ENTMCNC: 32.8 G/DL (ref 31–37)
MCV RBC AUTO: 82.5 FL (ref 78–100)
MONOCYTES # BLD: 0.72 K/UL (ref 0.05–1.2)
MONOCYTES NFR BLD: 15.9 % (ref 3–10)
NEUTS SEG # BLD: 3.06 K/UL (ref 1.8–8)
NEUTS SEG NFR BLD: 67.6 % (ref 40–73)
NITRITE UR QL STRIP.AUTO: NEGATIVE
NRBC # BLD: 0 K/UL (ref 0–0.01)
NRBC BLD-RTO: 0 PER 100 WBC
NT PRO BNP: 1722 PG/ML (ref 36–900)
PH UR STRIP: 6 (ref 5–8)
PLATELET # BLD AUTO: 159 K/UL (ref 135–420)
PMV BLD AUTO: 9.9 FL (ref 9.2–11.8)
POTASSIUM SERPL-SCNC: 2.8 MMOL/L (ref 3.5–5.5)
PROT SERPL-MCNC: 8.7 G/DL (ref 6.4–8.2)
PROT UR STRIP-MCNC: 100 MG/DL
RBC # BLD AUTO: 4.58 M/UL (ref 4.35–5.65)
RBC #/AREA URNS HPF: NORMAL /HPF (ref 0–5)
SODIUM SERPL-SCNC: 136 MMOL/L (ref 136–145)
SP GR UR REFRACTOMETRY: 1.01 (ref 1–1.03)
UROBILINOGEN UR QL STRIP.AUTO: 0.2 EU/DL (ref 0.2–1)
WBC # BLD AUTO: 4.5 K/UL (ref 4.6–13.2)
WBC URNS QL MICRO: NEGATIVE /HPF (ref 0–4)

## 2025-08-29 PROCEDURE — 93971 EXTREMITY STUDY: CPT

## 2025-08-29 PROCEDURE — 6370000000 HC RX 637 (ALT 250 FOR IP): Performed by: STUDENT IN AN ORGANIZED HEALTH CARE EDUCATION/TRAINING PROGRAM

## 2025-08-29 PROCEDURE — 99285 EMERGENCY DEPT VISIT HI MDM: CPT

## 2025-08-29 PROCEDURE — 82962 GLUCOSE BLOOD TEST: CPT

## 2025-08-29 PROCEDURE — 93005 ELECTROCARDIOGRAM TRACING: CPT

## 2025-08-29 PROCEDURE — 81001 URINALYSIS AUTO W/SCOPE: CPT

## 2025-08-29 PROCEDURE — 71045 X-RAY EXAM CHEST 1 VIEW: CPT

## 2025-08-29 PROCEDURE — 74176 CT ABD & PELVIS W/O CONTRAST: CPT

## 2025-08-29 PROCEDURE — 83735 ASSAY OF MAGNESIUM: CPT

## 2025-08-29 PROCEDURE — 85025 COMPLETE CBC W/AUTO DIFF WBC: CPT

## 2025-08-29 PROCEDURE — 83880 ASSAY OF NATRIURETIC PEPTIDE: CPT

## 2025-08-29 PROCEDURE — 81003 URINALYSIS AUTO W/O SCOPE: CPT

## 2025-08-29 PROCEDURE — 2500000003 HC RX 250 WO HCPCS: Performed by: STUDENT IN AN ORGANIZED HEALTH CARE EDUCATION/TRAINING PROGRAM

## 2025-08-29 PROCEDURE — 1100000003 HC PRIVATE W/ TELEMETRY

## 2025-08-29 PROCEDURE — 80053 COMPREHEN METABOLIC PANEL: CPT

## 2025-08-29 RX ORDER — INSULIN LISPRO 100 [IU]/ML
0-8 INJECTION, SOLUTION INTRAVENOUS; SUBCUTANEOUS
Status: DISCONTINUED | OUTPATIENT
Start: 2025-08-29 | End: 2025-08-31 | Stop reason: HOSPADM

## 2025-08-29 RX ORDER — TORSEMIDE 20 MG/1
20 TABLET ORAL DAILY
Status: DISCONTINUED | OUTPATIENT
Start: 2025-08-29 | End: 2025-08-29

## 2025-08-29 RX ORDER — ACETAMINOPHEN 650 MG/1
650 SUPPOSITORY RECTAL EVERY 6 HOURS PRN
Status: DISCONTINUED | OUTPATIENT
Start: 2025-08-29 | End: 2025-08-31 | Stop reason: HOSPADM

## 2025-08-29 RX ORDER — ONDANSETRON 2 MG/ML
4 INJECTION INTRAMUSCULAR; INTRAVENOUS EVERY 6 HOURS PRN
Status: DISCONTINUED | OUTPATIENT
Start: 2025-08-29 | End: 2025-08-31 | Stop reason: HOSPADM

## 2025-08-29 RX ORDER — INSULIN GLARGINE 100 [IU]/ML
10 INJECTION, SOLUTION SUBCUTANEOUS DAILY
Status: DISCONTINUED | OUTPATIENT
Start: 2025-08-30 | End: 2025-08-31 | Stop reason: HOSPADM

## 2025-08-29 RX ORDER — GLUCAGON 1 MG
1 KIT INJECTION PRN
Status: DISCONTINUED | OUTPATIENT
Start: 2025-08-29 | End: 2025-08-31 | Stop reason: HOSPADM

## 2025-08-29 RX ORDER — SODIUM CHLORIDE 0.9 % (FLUSH) 0.9 %
5-40 SYRINGE (ML) INJECTION EVERY 12 HOURS SCHEDULED
Status: DISCONTINUED | OUTPATIENT
Start: 2025-08-29 | End: 2025-08-31 | Stop reason: HOSPADM

## 2025-08-29 RX ORDER — SODIUM CHLORIDE 0.9 % (FLUSH) 0.9 %
5-40 SYRINGE (ML) INJECTION PRN
Status: DISCONTINUED | OUTPATIENT
Start: 2025-08-29 | End: 2025-08-31 | Stop reason: HOSPADM

## 2025-08-29 RX ORDER — ACETAMINOPHEN 325 MG/1
650 TABLET ORAL EVERY 6 HOURS PRN
Status: DISCONTINUED | OUTPATIENT
Start: 2025-08-29 | End: 2025-08-31 | Stop reason: HOSPADM

## 2025-08-29 RX ORDER — ONDANSETRON 4 MG/1
4 TABLET, ORALLY DISINTEGRATING ORAL EVERY 8 HOURS PRN
Status: DISCONTINUED | OUTPATIENT
Start: 2025-08-29 | End: 2025-08-31 | Stop reason: HOSPADM

## 2025-08-29 RX ORDER — ATORVASTATIN CALCIUM 20 MG/1
20 TABLET, FILM COATED ORAL DAILY
Status: DISCONTINUED | OUTPATIENT
Start: 2025-08-29 | End: 2025-08-31 | Stop reason: HOSPADM

## 2025-08-29 RX ORDER — SODIUM CHLORIDE 9 MG/ML
INJECTION, SOLUTION INTRAVENOUS PRN
Status: DISCONTINUED | OUTPATIENT
Start: 2025-08-29 | End: 2025-08-31 | Stop reason: HOSPADM

## 2025-08-29 RX ORDER — AMLODIPINE BESYLATE 10 MG/1
10 TABLET ORAL DAILY
Status: DISCONTINUED | OUTPATIENT
Start: 2025-08-29 | End: 2025-08-31 | Stop reason: HOSPADM

## 2025-08-29 RX ORDER — DEXTROSE MONOHYDRATE 100 MG/ML
INJECTION, SOLUTION INTRAVENOUS CONTINUOUS PRN
Status: DISCONTINUED | OUTPATIENT
Start: 2025-08-29 | End: 2025-08-31 | Stop reason: HOSPADM

## 2025-08-29 RX ADMIN — POTASSIUM BICARBONATE 20 MEQ: 782 TABLET, EFFERVESCENT ORAL at 14:42

## 2025-08-29 RX ADMIN — SODIUM CHLORIDE, PRESERVATIVE FREE 10 ML: 5 INJECTION INTRAVENOUS at 23:23

## 2025-08-29 RX ADMIN — ATORVASTATIN CALCIUM 20 MG: 20 TABLET, FILM COATED ORAL at 21:50

## 2025-08-29 RX ADMIN — INSULIN LISPRO 2 UNITS: 100 INJECTION, SOLUTION INTRAVENOUS; SUBCUTANEOUS at 23:22

## 2025-08-29 RX ADMIN — AMLODIPINE BESYLATE 10 MG: 10 TABLET ORAL at 21:50

## 2025-08-29 ASSESSMENT — PAIN SCALES - GENERAL
PAINLEVEL_OUTOF10: 0
PAINLEVEL_OUTOF10: 0

## 2025-08-29 ASSESSMENT — LIFESTYLE VARIABLES
HOW OFTEN DO YOU HAVE A DRINK CONTAINING ALCOHOL: NEVER
HOW MANY STANDARD DRINKS CONTAINING ALCOHOL DO YOU HAVE ON A TYPICAL DAY: PATIENT DOES NOT DRINK

## 2025-08-29 ASSESSMENT — PAIN - FUNCTIONAL ASSESSMENT: PAIN_FUNCTIONAL_ASSESSMENT: 0-10

## 2025-08-30 LAB
ALBUMIN SERPL-MCNC: 3.1 G/DL (ref 3.4–5)
ALBUMIN/GLOB SERPL: 0.6 (ref 0.8–1.7)
ALP SERPL-CCNC: 76 U/L (ref 45–117)
ALT SERPL-CCNC: 16 U/L (ref 10–50)
ANION GAP SERPL CALC-SCNC: 20 MMOL/L (ref 3–18)
AST SERPL-CCNC: 18 U/L (ref 10–38)
BASOPHILS # BLD: 0.04 K/UL (ref 0–0.1)
BASOPHILS NFR BLD: 0.8 % (ref 0–2)
BILIRUB SERPL-MCNC: 0.6 MG/DL (ref 0.2–1)
BUN SERPL-MCNC: 114 MG/DL (ref 6–23)
BUN/CREAT SERPL: 14 (ref 12–20)
CALCIUM SERPL-MCNC: 9 MG/DL (ref 8.5–10.1)
CHLORIDE SERPL-SCNC: 87 MMOL/L (ref 98–107)
CHLORIDE UR-SCNC: 30 MMOL/L (ref 110–250)
CO2 SERPL-SCNC: 30 MMOL/L (ref 21–32)
CREAT SERPL-MCNC: 7.97 MG/DL (ref 0.6–1.3)
CREAT UR-MCNC: 119 MG/DL (ref 30–125)
DIFFERENTIAL METHOD BLD: ABNORMAL
EOSINOPHIL # BLD: 0.1 K/UL (ref 0–0.4)
EOSINOPHIL #/AREA URNS HPF: NORMAL
EOSINOPHIL NFR BLD: 2 % (ref 0–5)
ERYTHROCYTE [DISTWIDTH] IN BLOOD BY AUTOMATED COUNT: 13.2 % (ref 11.6–14.5)
EST. AVERAGE GLUCOSE BLD GHB EST-MCNC: 151 MG/DL
GLOBULIN SER CALC-MCNC: 4.8 G/DL (ref 2–4)
GLUCOSE BLD STRIP.AUTO-MCNC: 124 MG/DL (ref 70–110)
GLUCOSE BLD STRIP.AUTO-MCNC: 216 MG/DL (ref 70–110)
GLUCOSE BLD STRIP.AUTO-MCNC: 218 MG/DL (ref 70–110)
GLUCOSE BLD STRIP.AUTO-MCNC: 264 MG/DL (ref 70–110)
GLUCOSE SERPL-MCNC: 126 MG/DL (ref 74–108)
HBA1C MFR BLD: 6.9 % (ref 4.2–5.6)
HCT VFR BLD AUTO: 35.6 % (ref 36–48)
HGB BLD-MCNC: 11.5 G/DL (ref 13–16)
IMM GRANULOCYTES # BLD AUTO: 0.04 K/UL (ref 0–0.04)
IMM GRANULOCYTES NFR BLD AUTO: 0.8 % (ref 0–0.5)
LYMPHOCYTES # BLD: 0.98 K/UL (ref 0.9–3.6)
LYMPHOCYTES NFR BLD: 20 % (ref 21–52)
MCH RBC QN AUTO: 26.7 PG (ref 24–34)
MCHC RBC AUTO-ENTMCNC: 32.3 G/DL (ref 31–37)
MCV RBC AUTO: 82.8 FL (ref 78–100)
MONOCYTES # BLD: 0.97 K/UL (ref 0.05–1.2)
MONOCYTES NFR BLD: 19.8 % (ref 3–10)
NEUTS SEG # BLD: 2.78 K/UL (ref 1.8–8)
NEUTS SEG NFR BLD: 56.6 % (ref 40–73)
NRBC # BLD: 0 K/UL (ref 0–0.01)
NRBC BLD-RTO: 0 PER 100 WBC
PLATELET # BLD AUTO: 159 K/UL (ref 135–420)
PMV BLD AUTO: 10.1 FL (ref 9.2–11.8)
POTASSIUM SERPL-SCNC: 2.6 MMOL/L (ref 3.5–5.5)
PROT SERPL-MCNC: 7.9 G/DL (ref 6.4–8.2)
PROT UR-MCNC: 90 MG/DL (ref 0–12)
RBC # BLD AUTO: 4.3 M/UL (ref 4.35–5.65)
SODIUM SERPL-SCNC: 137 MMOL/L (ref 136–145)
SODIUM UR-SCNC: 36 MMOL/L (ref 40–220)
WBC # BLD AUTO: 4.9 K/UL (ref 4.6–13.2)

## 2025-08-30 PROCEDURE — 1100000003 HC PRIVATE W/ TELEMETRY

## 2025-08-30 PROCEDURE — 85025 COMPLETE CBC W/AUTO DIFF WBC: CPT

## 2025-08-30 PROCEDURE — 2500000003 HC RX 250 WO HCPCS: Performed by: STUDENT IN AN ORGANIZED HEALTH CARE EDUCATION/TRAINING PROGRAM

## 2025-08-30 PROCEDURE — 6370000000 HC RX 637 (ALT 250 FOR IP): Performed by: HEALTH CARE PROVIDER

## 2025-08-30 PROCEDURE — 36415 COLL VENOUS BLD VENIPUNCTURE: CPT

## 2025-08-30 PROCEDURE — 84156 ASSAY OF PROTEIN URINE: CPT

## 2025-08-30 PROCEDURE — 6370000000 HC RX 637 (ALT 250 FOR IP): Performed by: STUDENT IN AN ORGANIZED HEALTH CARE EDUCATION/TRAINING PROGRAM

## 2025-08-30 PROCEDURE — 82570 ASSAY OF URINE CREATININE: CPT

## 2025-08-30 PROCEDURE — 83036 HEMOGLOBIN GLYCOSYLATED A1C: CPT

## 2025-08-30 PROCEDURE — 82436 ASSAY OF URINE CHLORIDE: CPT

## 2025-08-30 PROCEDURE — 99222 1ST HOSP IP/OBS MODERATE 55: CPT | Performed by: HEALTH CARE PROVIDER

## 2025-08-30 PROCEDURE — 6370000000 HC RX 637 (ALT 250 FOR IP): Performed by: INTERNAL MEDICINE

## 2025-08-30 PROCEDURE — 82962 GLUCOSE BLOOD TEST: CPT

## 2025-08-30 PROCEDURE — 84300 ASSAY OF URINE SODIUM: CPT

## 2025-08-30 PROCEDURE — 94761 N-INVAS EAR/PLS OXIMETRY MLT: CPT

## 2025-08-30 PROCEDURE — 80053 COMPREHEN METABOLIC PANEL: CPT

## 2025-08-30 PROCEDURE — 6360000002 HC RX W HCPCS: Performed by: HEALTH CARE PROVIDER

## 2025-08-30 PROCEDURE — 87205 SMEAR GRAM STAIN: CPT

## 2025-08-30 RX ORDER — HEPARIN SODIUM 5000 [USP'U]/ML
5000 INJECTION, SOLUTION INTRAVENOUS; SUBCUTANEOUS 2 TIMES DAILY
Status: DISCONTINUED | OUTPATIENT
Start: 2025-08-30 | End: 2025-08-31 | Stop reason: HOSPADM

## 2025-08-30 RX ADMIN — INSULIN GLARGINE 10 UNITS: 100 INJECTION, SOLUTION SUBCUTANEOUS at 09:16

## 2025-08-30 RX ADMIN — INSULIN LISPRO 4 UNITS: 100 INJECTION, SOLUTION INTRAVENOUS; SUBCUTANEOUS at 13:04

## 2025-08-30 RX ADMIN — HEPARIN SODIUM 5000 UNITS: 5000 INJECTION, SOLUTION INTRAVENOUS; SUBCUTANEOUS at 23:36

## 2025-08-30 RX ADMIN — ATORVASTATIN CALCIUM 20 MG: 20 TABLET, FILM COATED ORAL at 23:48

## 2025-08-30 RX ADMIN — POTASSIUM BICARBONATE 40 MEQ: 782 TABLET, EFFERVESCENT ORAL at 13:04

## 2025-08-30 RX ADMIN — INSULIN LISPRO 2 UNITS: 100 INJECTION, SOLUTION INTRAVENOUS; SUBCUTANEOUS at 17:25

## 2025-08-30 RX ADMIN — INSULIN LISPRO 2 UNITS: 100 INJECTION, SOLUTION INTRAVENOUS; SUBCUTANEOUS at 23:46

## 2025-08-30 RX ADMIN — SODIUM CHLORIDE, PRESERVATIVE FREE 10 ML: 5 INJECTION INTRAVENOUS at 23:36

## 2025-08-30 RX ADMIN — SODIUM CHLORIDE, PRESERVATIVE FREE 10 ML: 5 INJECTION INTRAVENOUS at 09:16

## 2025-08-30 RX ADMIN — POTASSIUM BICARBONATE 40 MEQ: 782 TABLET, EFFERVESCENT ORAL at 23:36

## 2025-08-30 RX ADMIN — AMLODIPINE BESYLATE 10 MG: 10 TABLET ORAL at 09:16

## 2025-08-30 RX ADMIN — POTASSIUM BICARBONATE 40 MEQ: 782 TABLET, EFFERVESCENT ORAL at 12:29

## 2025-08-30 ASSESSMENT — PAIN SCALES - GENERAL
PAINLEVEL_OUTOF10: 0

## 2025-08-31 ENCOUNTER — APPOINTMENT (OUTPATIENT)
Facility: HOSPITAL | Age: 70
DRG: 641 | End: 2025-08-31
Attending: INTERNAL MEDICINE
Payer: OTHER GOVERNMENT

## 2025-08-31 VITALS
WEIGHT: 260 LBS | RESPIRATION RATE: 18 BRPM | HEIGHT: 74 IN | TEMPERATURE: 97.5 F | HEART RATE: 75 BPM | BODY MASS INDEX: 33.37 KG/M2 | DIASTOLIC BLOOD PRESSURE: 83 MMHG | SYSTOLIC BLOOD PRESSURE: 146 MMHG | OXYGEN SATURATION: 97 %

## 2025-08-31 LAB
ALBUMIN SERPL-MCNC: 3 G/DL (ref 3.4–5)
ALBUMIN SERPL-MCNC: 3 G/DL (ref 3.4–5)
ALBUMIN/GLOB SERPL: 0.6 (ref 0.8–1.7)
ALP SERPL-CCNC: 74 U/L (ref 45–117)
ALT SERPL-CCNC: 23 U/L (ref 10–50)
ANION GAP SERPL CALC-SCNC: 19 MMOL/L (ref 3–18)
ANION GAP SERPL CALC-SCNC: 19 MMOL/L (ref 3–18)
AST SERPL-CCNC: 23 U/L (ref 10–38)
BASOPHILS # BLD: 0.01 K/UL (ref 0–0.1)
BASOPHILS NFR BLD: 0.2 % (ref 0–2)
BILIRUB SERPL-MCNC: 0.5 MG/DL (ref 0.2–1)
BUN SERPL-MCNC: 118 MG/DL (ref 6–23)
BUN SERPL-MCNC: 118 MG/DL (ref 6–23)
BUN/CREAT SERPL: 15 (ref 12–20)
BUN/CREAT SERPL: 15 (ref 12–20)
CALCIUM SERPL-MCNC: 8.5 MG/DL (ref 8.5–10.1)
CALCIUM SERPL-MCNC: 8.8 MG/DL (ref 8.5–10.1)
CALCIUM SERPL-MCNC: 8.8 MG/DL (ref 8.5–10.1)
CHLORIDE SERPL-SCNC: 86 MMOL/L (ref 98–107)
CHLORIDE SERPL-SCNC: 86 MMOL/L (ref 98–107)
CO2 SERPL-SCNC: 31 MMOL/L (ref 21–32)
CO2 SERPL-SCNC: 31 MMOL/L (ref 21–32)
CREAT SERPL-MCNC: 7.82 MG/DL (ref 0.6–1.3)
CREAT SERPL-MCNC: 7.97 MG/DL (ref 0.6–1.3)
DIFFERENTIAL METHOD BLD: ABNORMAL
EOSINOPHIL # BLD: 0.14 K/UL (ref 0–0.4)
EOSINOPHIL NFR BLD: 2.6 % (ref 0–5)
ERYTHROCYTE [DISTWIDTH] IN BLOOD BY AUTOMATED COUNT: 13.2 % (ref 11.6–14.5)
GLOBULIN SER CALC-MCNC: 4.7 G/DL (ref 2–4)
GLUCOSE BLD STRIP.AUTO-MCNC: 133 MG/DL (ref 70–110)
GLUCOSE BLD STRIP.AUTO-MCNC: 201 MG/DL (ref 70–110)
GLUCOSE SERPL-MCNC: 103 MG/DL (ref 74–108)
GLUCOSE SERPL-MCNC: 104 MG/DL (ref 74–108)
HCT VFR BLD AUTO: 34.2 % (ref 36–48)
HGB BLD-MCNC: 11.2 G/DL (ref 13–16)
IMM GRANULOCYTES # BLD AUTO: 0.01 K/UL (ref 0–0.04)
IMM GRANULOCYTES NFR BLD AUTO: 0.2 % (ref 0–0.5)
LYMPHOCYTES # BLD: 1.33 K/UL (ref 0.9–3.6)
LYMPHOCYTES NFR BLD: 24.6 % (ref 21–52)
MAGNESIUM SERPL-MCNC: 2.1 MG/DL (ref 1.6–2.6)
MCH RBC QN AUTO: 27.1 PG (ref 24–34)
MCHC RBC AUTO-ENTMCNC: 32.7 G/DL (ref 31–37)
MCV RBC AUTO: 82.8 FL (ref 78–100)
MONOCYTES # BLD: 1.39 K/UL (ref 0.05–1.2)
MONOCYTES NFR BLD: 25.7 % (ref 3–10)
NEUTS SEG # BLD: 2.52 K/UL (ref 1.8–8)
NEUTS SEG NFR BLD: 46.7 % (ref 40–73)
NRBC # BLD: 0 K/UL (ref 0–0.01)
NRBC BLD-RTO: 0 PER 100 WBC
PHOSPHATE SERPL-MCNC: 7.1 MG/DL (ref 2.5–4.9)
PLATELET # BLD AUTO: 153 K/UL (ref 135–420)
PMV BLD AUTO: 10.1 FL (ref 9.2–11.8)
POTASSIUM SERPL-SCNC: 3.2 MMOL/L (ref 3.5–5.5)
POTASSIUM SERPL-SCNC: 3.2 MMOL/L (ref 3.5–5.5)
PROT SERPL-MCNC: 7.7 G/DL (ref 6.4–8.2)
PTH-INTACT SERPL-MCNC: 813 PG/ML (ref 15–65)
RBC # BLD AUTO: 4.13 M/UL (ref 4.35–5.65)
SODIUM SERPL-SCNC: 137 MMOL/L (ref 136–145)
SODIUM SERPL-SCNC: 137 MMOL/L (ref 136–145)
WBC # BLD AUTO: 5.4 K/UL (ref 4.6–13.2)

## 2025-08-31 PROCEDURE — 85025 COMPLETE CBC W/AUTO DIFF WBC: CPT

## 2025-08-31 PROCEDURE — 2500000003 HC RX 250 WO HCPCS: Performed by: STUDENT IN AN ORGANIZED HEALTH CARE EDUCATION/TRAINING PROGRAM

## 2025-08-31 PROCEDURE — 83970 ASSAY OF PARATHORMONE: CPT

## 2025-08-31 PROCEDURE — 93970 EXTREMITY STUDY: CPT

## 2025-08-31 PROCEDURE — 80053 COMPREHEN METABOLIC PANEL: CPT

## 2025-08-31 PROCEDURE — 6370000000 HC RX 637 (ALT 250 FOR IP): Performed by: STUDENT IN AN ORGANIZED HEALTH CARE EDUCATION/TRAINING PROGRAM

## 2025-08-31 PROCEDURE — 6370000000 HC RX 637 (ALT 250 FOR IP): Performed by: INTERNAL MEDICINE

## 2025-08-31 PROCEDURE — 83735 ASSAY OF MAGNESIUM: CPT

## 2025-08-31 PROCEDURE — 82962 GLUCOSE BLOOD TEST: CPT

## 2025-08-31 PROCEDURE — 80069 RENAL FUNCTION PANEL: CPT

## 2025-08-31 PROCEDURE — 36415 COLL VENOUS BLD VENIPUNCTURE: CPT

## 2025-08-31 PROCEDURE — 94761 N-INVAS EAR/PLS OXIMETRY MLT: CPT

## 2025-08-31 PROCEDURE — 6360000002 HC RX W HCPCS: Performed by: HEALTH CARE PROVIDER

## 2025-08-31 PROCEDURE — 99222 1ST HOSP IP/OBS MODERATE 55: CPT | Performed by: HEALTH CARE PROVIDER

## 2025-08-31 RX ORDER — CALCITRIOL 0.25 UG/1
0.25 CAPSULE, LIQUID FILLED ORAL DAILY
Qty: 30 CAPSULE | Refills: 3 | Status: SHIPPED | OUTPATIENT
Start: 2025-08-31

## 2025-08-31 RX ORDER — CALCITRIOL 0.25 UG/1
0.25 CAPSULE, LIQUID FILLED ORAL DAILY
Status: DISCONTINUED | OUTPATIENT
Start: 2025-08-31 | End: 2025-08-31 | Stop reason: HOSPADM

## 2025-08-31 RX ORDER — SEVELAMER CARBONATE 800 MG/1
1600 TABLET, FILM COATED ORAL
Status: DISCONTINUED | OUTPATIENT
Start: 2025-08-31 | End: 2025-08-31 | Stop reason: HOSPADM

## 2025-08-31 RX ORDER — SEVELAMER CARBONATE 800 MG/1
1600 TABLET, FILM COATED ORAL
Qty: 90 TABLET | Refills: 3 | Status: SHIPPED | OUTPATIENT
Start: 2025-08-31

## 2025-08-31 RX ADMIN — AMLODIPINE BESYLATE 10 MG: 10 TABLET ORAL at 09:51

## 2025-08-31 RX ADMIN — INSULIN LISPRO 2 UNITS: 100 INJECTION, SOLUTION INTRAVENOUS; SUBCUTANEOUS at 12:35

## 2025-08-31 RX ADMIN — POTASSIUM BICARBONATE 40 MEQ: 782 TABLET, EFFERVESCENT ORAL at 09:59

## 2025-08-31 RX ADMIN — CALCITRIOL CAPSULES 0.25 MCG 0.25 MCG: 0.25 CAPSULE ORAL at 12:35

## 2025-08-31 RX ADMIN — SODIUM CHLORIDE, PRESERVATIVE FREE 10 ML: 5 INJECTION INTRAVENOUS at 09:48

## 2025-08-31 RX ADMIN — INSULIN GLARGINE 10 UNITS: 100 INJECTION, SOLUTION SUBCUTANEOUS at 09:47

## 2025-08-31 RX ADMIN — SEVELAMER CARBONATE 1600 MG: 800 TABLET, FILM COATED ORAL at 12:35

## 2025-08-31 RX ADMIN — HEPARIN SODIUM 5000 UNITS: 5000 INJECTION, SOLUTION INTRAVENOUS; SUBCUTANEOUS at 09:48

## 2025-08-31 ASSESSMENT — PAIN SCALES - GENERAL
PAINLEVEL_OUTOF10: 0

## 2025-09-02 LAB
ECHO BSA: 2.48 M2
ECHO BSA: 2.48 M2
Lab: 0.86 CM
Lab: 0.89 CM
Lab: 0.91 CM
Lab: 0.97 CM
Lab: 1.22 CM
VAS LEFT ANTECUBITAL VEIN DIAM: 0.46 CM
VAS LEFT AX A MID PSV: 95.1 CM/S
VAS LEFT AXILLARY ARTERY AP DIAMETER: 0.77 CM
VAS LEFT AXILLARY VEIN DIAMETER: 0.68 CM
VAS LEFT BASILIC VEIN LOWER ARM MID DIAM: 0.25 CM
VAS LEFT BASILIC VEIN LOWER ARM PROX DIAM: 0.27 CM
VAS LEFT BASILIC VEIN UPPER ARM CONFLUENCE DIAM: 0.39 CM
VAS LEFT BASILIC VEIN UPPER ARM DIST DIAM: 0.35 CM
VAS LEFT BASILIC VEIN UPPER ARM MID DIAM: 0.34 CM
VAS LEFT BASILIC VEIN UPPER ARM PROX DIAM: 0.42 CM
VAS LEFT BRACHIAL A DIST EDV: 0 CM/S
VAS LEFT BRACHIAL A DIST PSV: 88.5 CM/S
VAS LEFT BRACHIAL A MID EDV: 4.4 CM/S
VAS LEFT BRACHIAL A MID PSV: 113.6 CM/S
VAS LEFT BRACHIAL A PROX EDV: 0 CM/S
VAS LEFT BRACHIAL A PROX PSV: 121.4 CM/S
VAS LEFT BRACHIAL DIST AP DIAM: 0.7 CM
VAS LEFT BRACHIAL MID AP DIAM: 0.53 CM
VAS LEFT BRACHIAL PROX AP DIAM: 0.65 CM
VAS LEFT BRACHIAL VEIN 1 DIAM: 0.43 CM
VAS LEFT BRACHIAL VEIN 2 DIAM: 0.53 CM
VAS LEFT CEPHALIC VEIN LOWER ARM DIST DIAM: 0.42 CM
VAS LEFT CEPHALIC VEIN LOWER ARM MID DIAM: 0.27 CM
VAS LEFT CEPHALIC VEIN LOWER ARM PROX DIAM: 0.3 CM
VAS LEFT CEPHALIC VEIN UPPER ARM CONFLUENCE DIAM: 0.45 CM
VAS LEFT CEPHALIC VEIN UPPER ARM DIST DIAM: 0.31 CM
VAS LEFT CEPHALIC VEIN UPPER ARM MID DIAM: 0.33 CM
VAS LEFT CEPHALIC VEIN UPPER ARM PROX DIAM: 0.34 CM
VAS LEFT RADIAL A DIST PSV: 76.4 CM/S
VAS LEFT RADIAL A MID PSV: 80.7 CM/S
VAS LEFT RADIAL A PROX PSV: 70.6 CM/S
VAS LEFT RADIAL DIST AP DIAM: 0.3 CM
VAS LEFT RADIAL MID AP DIAM: 0.35 CM
VAS LEFT RADIAL PROX AP DIAM: 0.36 CM
VAS LEFT SUBCLAVIAN AP MID DIAM: 0.97 CM
VAS LEFT SUBCLAVIAN DIST EDV: 0 CM/S
VAS LEFT SUBCLAVIAN DIST PSV: 101.6 CM/S
VAS LEFT SUBCLAVIAN MID EDV: 0 CM/S
VAS LEFT SUBCLAVIAN MID PSV: 95.6 CM/S
VAS LEFT SUBCLAVIAN PROX EDV: 1.3 CM/S
VAS LEFT SUBCLAVIAN PROX PSV: 84.3 CM/S
VAS LEFT SUBCLAVIAN VEIN DIAMETER: 1.01 CM
VAS LEFT ULNAR A DIST PSV: 70.9 CM/S
VAS LEFT ULNAR A MID PSV: 66.1 CM/S
VAS LEFT ULNAR A PROX PSV: 70.9 CM/S
VAS LEFT ULNAR DIST AP DIAM: 0.29 CM
VAS LEFT ULNAR MID AP DIAM: 0.29 CM
VAS LEFT ULNAR PROX AP DIAM: 0.47 CM
VAS RIGHT AX A MID PSV: 90.4 CM/S
VAS RIGHT AXILLARY ARTERY AP DIAMETER: 0.6 CM
VAS RIGHT AXILLARY VEIN DIAMETER: 0.75 CM
VAS RIGHT BASILIC VEIN LOWER ARM MID DIAM: 0.19 CM
VAS RIGHT BASILIC VEIN LOWER ARM PROX DIAM: 0.28 CM
VAS RIGHT BASILIC VEIN UPPER ARM CONFLUENCE DIAM: 0.36 CM
VAS RIGHT BASILIC VEIN UPPER ARM DIST DIAM: 0.51 CM
VAS RIGHT BASILIC VEIN UPPER ARM MID DIAM: 0.47 CM
VAS RIGHT BASILIC VEIN UPPER ARM PROX DIAM: 0.44 CM
VAS RIGHT BASLIC VEIN LOWER ARM DIST DIAM: 0.15 CM
VAS RIGHT BRACHIAL A DIST EDV: 0 CM/S
VAS RIGHT BRACHIAL A DIST PSV: 60.8 CM/S
VAS RIGHT BRACHIAL A MID EDV: 0 CM/S
VAS RIGHT BRACHIAL A MID PSV: 117.9 CM/S
VAS RIGHT BRACHIAL A PROX EDV: 3.7 CM/S
VAS RIGHT BRACHIAL A PROX PSV: 121.8 CM/S
VAS RIGHT BRACHIAL DIST AP DIAM: 0.65 CM
VAS RIGHT BRACHIAL MID AP DIAM: 0.65 CM
VAS RIGHT BRACHIAL PROX AP DIAM: 0.78 CM
VAS RIGHT BRACHIAL VEIN 1 DIAM: 0.69 CM
VAS RIGHT BRACHIAL VEIN 2 DIAM: 0.59 CM
VAS RIGHT CEPHALIC VEIN LOWER ARM DIST DIAM: 0.25 CM
VAS RIGHT CEPHALIC VEIN LOWER ARM MID DIAM: 0.22 CM
VAS RIGHT CEPHALIC VEIN LOWER ARM PROX DIAM: 0.19 CM
VAS RIGHT CEPHALIC VEIN UPPER ARM CONFLUENCE DIAM: 0.16 CM
VAS RIGHT CEPHALIC VEIN UPPER ARM DIST DIAM: 0.13 CM
VAS RIGHT CEPHALIC VEIN UPPER ARM MID DIAM: 0.18 CM
VAS RIGHT CEPHALIC VEIN UPPER ARM PROX DIAM: 0.18 CM
VAS RIGHT CEPHALIC VEIN WRIST DIAM: 0.38 CM
VAS RIGHT RADIAL A DIST PSV: 86.4 CM/S
VAS RIGHT RADIAL A MID PSV: 82.4 CM/S
VAS RIGHT RADIAL A PROX PSV: 84.4 CM/S
VAS RIGHT RADIAL DIST AP DIAM: 0.44 CM
VAS RIGHT RADIAL MID AP DIAM: 0.43 CM
VAS RIGHT RADIAL PROX AP DIAM: 0.37 CM
VAS RIGHT SUBCLAVIAN DIST EDV: 11.6 CM/S
VAS RIGHT SUBCLAVIAN DIST PSV: 106.1 CM/S
VAS RIGHT SUBCLAVIAN MID EDV: 0 CM/S
VAS RIGHT SUBCLAVIAN MID PSV: 90.4 CM/S
VAS RIGHT SUBCLAVIAN PROX EDV: 15.6 CM/S
VAS RIGHT SUBCLAVIAN PROX PSV: 122 CM/S
VAS RIGHT SUBCLAVIAN VEIN DIAMETER: 1.01 CM
VAS RIGHT ULNAR A DIST PSV: 69.3 CM/S
VAS RIGHT ULNAR A MID PSV: 114.1 CM/S
VAS RIGHT ULNAR A PROX PSV: 65.6 CM/S
VAS RIGHT ULNAR DIST AP DIAM: 0.22 CM
VAS RIGHT ULNAR MID AP DIAM: 0.22 CM
VAS RIGHT ULNAR PROX AP DIAM: 0.43 CM

## 2025-09-04 ENCOUNTER — TRANSCRIBE ORDERS (OUTPATIENT)
Facility: HOSPITAL | Age: 70
End: 2025-09-04

## 2025-09-04 ENCOUNTER — HOSPITAL ENCOUNTER (OUTPATIENT)
Facility: HOSPITAL | Age: 70
Discharge: HOME | End: 2025-09-04
Payer: OTHER GOVERNMENT

## 2025-09-04 DIAGNOSIS — N18.9 CHRONIC KIDNEY DISEASE, UNSPECIFIED CKD STAGE: Primary | ICD-10-CM

## 2025-09-04 DIAGNOSIS — N18.9 CHRONIC KIDNEY DISEASE, UNSPECIFIED CKD STAGE: ICD-10-CM

## 2025-09-04 LAB
ALBUMIN SERPL-MCNC: 3.3 G/DL (ref 3.4–5)
ANION GAP SERPL CALC-SCNC: 18 MMOL/L (ref 3–18)
BUN SERPL-MCNC: 112 MG/DL (ref 6–23)
BUN/CREAT SERPL: 15 (ref 12–20)
CALCIUM SERPL-MCNC: 9.3 MG/DL (ref 8.5–10.1)
CHLORIDE SERPL-SCNC: 90 MMOL/L (ref 98–107)
CO2 SERPL-SCNC: 31 MMOL/L (ref 21–32)
CREAT SERPL-MCNC: 7.52 MG/DL (ref 0.6–1.3)
GLUCOSE SERPL-MCNC: 187 MG/DL (ref 74–108)
PHOSPHATE SERPL-MCNC: 5.9 MG/DL (ref 2.5–4.9)
POTASSIUM SERPL-SCNC: 3.2 MMOL/L (ref 3.5–5.5)
SODIUM SERPL-SCNC: 138 MMOL/L (ref 136–145)

## 2025-09-04 PROCEDURE — 36415 COLL VENOUS BLD VENIPUNCTURE: CPT

## 2025-09-04 PROCEDURE — 80069 RENAL FUNCTION PANEL: CPT
